# Patient Record
Sex: FEMALE | Race: WHITE
[De-identification: names, ages, dates, MRNs, and addresses within clinical notes are randomized per-mention and may not be internally consistent; named-entity substitution may affect disease eponyms.]

---

## 2019-04-10 ENCOUNTER — HOSPITAL ENCOUNTER (EMERGENCY)
Dept: HOSPITAL 75 - ER | Age: 34
Discharge: HOME | End: 2019-04-10
Payer: MEDICAID

## 2019-04-10 VITALS — DIASTOLIC BLOOD PRESSURE: 91 MMHG | SYSTOLIC BLOOD PRESSURE: 119 MMHG

## 2019-04-10 VITALS — BODY MASS INDEX: 40.98 KG/M2 | WEIGHT: 243 LBS | HEIGHT: 64.5 IN

## 2019-04-10 DIAGNOSIS — Z88.1: ICD-10-CM

## 2019-04-10 DIAGNOSIS — B34.9: Primary | ICD-10-CM

## 2019-04-10 LAB
BASOPHILS # BLD AUTO: 0 10^3/UL (ref 0–0.1)
BASOPHILS NFR BLD AUTO: 0 % (ref 0–10)
BUN/CREAT SERPL: 8
CALCIUM SERPL-MCNC: 9.7 MG/DL (ref 8.5–10.1)
CHLORIDE SERPL-SCNC: 107 MMOL/L (ref 98–107)
CO2 SERPL-SCNC: 19 MMOL/L (ref 21–32)
CREAT SERPL-MCNC: 1.33 MG/DL (ref 0.6–1.3)
EOSINOPHIL # BLD AUTO: 0.2 10^3/UL (ref 0–0.3)
EOSINOPHIL NFR BLD AUTO: 2 % (ref 0–10)
ERYTHROCYTE [DISTWIDTH] IN BLOOD BY AUTOMATED COUNT: 14.1 % (ref 10–14.5)
GFR SERPLBLD BASED ON 1.73 SQ M-ARVRAT: 47 ML/MIN
GLUCOSE SERPL-MCNC: 116 MG/DL (ref 70–105)
HCT VFR BLD CALC: 41 % (ref 35–52)
HGB BLD-MCNC: 14.2 G/DL (ref 11.5–16)
LYMPHOCYTES # BLD AUTO: 1.1 X 10^3 (ref 1–4)
LYMPHOCYTES NFR BLD AUTO: 9 % (ref 12–44)
MANUAL DIFFERENTIAL PERFORMED BLD QL: NO
MCH RBC QN AUTO: 28 PG (ref 25–34)
MCHC RBC AUTO-ENTMCNC: 34 G/DL (ref 32–36)
MCV RBC AUTO: 83 FL (ref 80–99)
MONOCYTES # BLD AUTO: 0.9 X 10^3 (ref 0–1)
MONOCYTES NFR BLD AUTO: 7 % (ref 0–12)
NEUTROPHILS # BLD AUTO: 9.8 X 10^3 (ref 1.8–7.8)
NEUTROPHILS NFR BLD AUTO: 81 % (ref 42–75)
PLATELET # BLD: 284 10^3/UL (ref 130–400)
PMV BLD AUTO: 9.1 FL (ref 7.4–10.4)
POTASSIUM SERPL-SCNC: 3.9 MMOL/L (ref 3.6–5)
SODIUM SERPL-SCNC: 137 MMOL/L (ref 135–145)
WBC # BLD AUTO: 12 10^3/UL (ref 4.3–11)

## 2019-04-10 PROCEDURE — 36415 COLL VENOUS BLD VENIPUNCTURE: CPT

## 2019-04-10 PROCEDURE — 87804 INFLUENZA ASSAY W/OPTIC: CPT

## 2019-04-10 PROCEDURE — 84703 CHORIONIC GONADOTROPIN ASSAY: CPT

## 2019-04-10 PROCEDURE — 80048 BASIC METABOLIC PNL TOTAL CA: CPT

## 2019-04-10 PROCEDURE — 93005 ELECTROCARDIOGRAM TRACING: CPT

## 2019-04-10 PROCEDURE — 85025 COMPLETE CBC W/AUTO DIFF WBC: CPT

## 2019-04-10 NOTE — XMS REPORT
Continuity of Care Document

 Created on: 04/10/2019



JOSE RAUL GARCIA

External Reference #: [id number]

: 1985

Sex: Female



Demographics







 Address  [address] [place]  [address]

 

 Home Phone  [phone] x

 

 Preferred Language  Unknown

 

 Marital Status  Unknown

 

 Rastafari Affiliation  Unknown

 

 Race  Unknown

 

 Ethnic Group  Unknown





Author







 Organization  Unknown

 

 Address  Unknown

 

 Phone  [phone]



              



Allergies

      





 Active            Description            Code            Type            
Severity            Reaction            Onset            Reported/Identified   
         Relationship to Patient            Clinical Status        

 

 Yes            Ceclor                         Drug Allergy            N/A     
       N/A                         2014                                  

 

 Yes            latex                         Drug Allergy            N/A      
      N/A                         2014                                  

 

 Yes            Penicillins                         Drug Allergy            N/A
            N/A                         2014                             
     



                      



Medications

      



There is no data.                  



Problems

      





 Date Dx Coded            Attending            Type            Code            
Diagnosis            Diagnosed By        

 

 2008            HENOK BOOKER MD                         V25.49       
     SURVEILLANCE OF OTHER CONTRACEPTIVE METHOD                     

 

 2008            GAYATHRI MEDRANO                         V25.49  
          SURVEILLANCE OF OTHER CONTRACEPTIVE METHOD                     

 

 2013            HENOK BOOKER MD                         V72.42       
     PREGNANCY TEST POSITIVE RESULT                     

 

 2013            GAYATHRI MEDRANO                         V72.42  
          PREGNANCY TEST POSITIVE RESULT                     

 

 2014            GAYATHRI MEDRANO                         719.47  
          PAIN IN JOINT INVOLVING ANKLE AND FOOT                     

 

 2014            GAYATHRI MEDRANO                         845.00  
          UNSPECIFIED SITE OF ANKLE SPRAIN                     



                            



Procedures

      





 Code            Description            Performed By            Performed On   
     

 

             56379                                  URINE PREGNANCY TEST (IN-
HOUSE)                                   2013        



                  



Results

      



There is no data.              



Encounters

      





 ACCT No.            Visit Date/Time            Discharge            Status    
        Pt. Type            Provider            Facility            Loc./Unit  
          Complaint        

 

 [id number]            2014 08:50:00            2014 23:59:59          
  [facility]            Outpatient            GAYATHRI MEDRANO                 
                              

 

 [id number]            2013 14:04:00            2013 23:59:59          
  [facility]            Outpatient            HENOK BOOKER MD

## 2019-04-10 NOTE — ED EENT
History of Present Illness


General


Stated Complaint:  EARACHE IN BOTH EARS,NAUSEA,FEVER


Source:  patient


Exam Limitations:  no limitations





History of Present Illness


Date Seen by Provider:  Apr 10, 2019


Time Seen by Provider:  22:03


Initial Comments


to ER per private vehicle with reports of awakening this morning with pain in 

both ears that has progressed throughout the day, nasal congestion, body aches 

nausea and fever. No cough and no shortness of breath.


Timing/Duration:  abrupt, this morning


Severity:  moderate


Associated Symptoms:  No cough; fever, nasal congestion/drainage





Allergies and Home Medications


Allergies


Coded Allergies:  


     Cefaclor (Verified  Allergy, Unknown, 6/27/08)





Patient Home Medication List


Home Medication List Reviewed:  Yes





Review of Systems


Review of Systems


Constitutional:  see HPI, chills, malaise


Eyes:  No Symptoms Reported


Ears:  No Symptoms Reported


Nose:  see HPI, congestion


Mouth:  no symptoms reported


Throat:  see HPI, pain


Respiratory:  no symptoms reported


Cardiovascular:  no symptoms reported


Musculoskeletal:  no symptoms reported


Skin:  no symptoms reported


Neurological:  No Symptoms Reported


Hematologic/Lymphatic:  No Symptoms Reported


Immunological/Allergic:  no symptoms reported





Past Medical-Social-Family Hx


Patient Social History


Recent Foreign Travel:  No


Contact w/Someone Who Travel:  No





Physical Exam


Height, Weight, BMI


Height: '"


Weight: lbs. oz. kg;  BMI


Method:


General Appearance:  WD/WN, no apparent distress


Eyes:  bilateral eye normal inspection, bilateral eye PERRL, bilateral eye EOMI


Ears:  bilateral ear auricle normal, bilateral ear canal normal, bilateral ear 

TM normal


Mouth/Throat:  normal mouth inspection, pharynx normal; No maxillary swelling, 

No pharynx swelling, No pharynx tenderness, No tongue swollen, No tonsillar 

exudate, No tonsillar swelling, No trismus, No uvula swelling, No voice changes


Neck:  non-tender, full range of motion


Cardiovascular:  tachycardia (tachycardic at 145)


Respiratory:  lungs clear, normal breath sounds, no respiratory distress, no 

accessory muscle use; No decreased breath sounds, No accessory muscle use, No 

crackles, No rales, No rhonchi, No stridor, No wheezing


Neurologic/Psychiatric:  alert, normal mood/affect, oriented x 3


Skin:  normal color, warm/dry





Progress/Results/Core Measures


Results/Orders


Lab Results





Laboratory Tests








Test


 4/10/19


22:05 Range/Units


 


 


White Blood Count


 12.0 H


 4.3-11.0


10^3/uL


 


Red Blood Count


 5.01 


 4.35-5.85


10^6/uL


 


Hemoglobin 14.2  11.5-16.0  G/DL


 


Hematocrit 41  35-52  %


 


Mean Corpuscular Volume 83  80-99  FL


 


Mean Corpuscular Hemoglobin 28  25-34  PG


 


Mean Corpuscular Hemoglobin


Concent 34 


 32-36  G/DL





 


Red Cell Distribution Width 14.1  10.0-14.5  %


 


Platelet Count


 284 


 130-400


10^3/uL


 


Mean Platelet Volume 9.1  7.4-10.4  FL


 


Neutrophils (%) (Auto) 81 H 42-75  %


 


Lymphocytes (%) (Auto) 9 L 12-44  %


 


Monocytes (%) (Auto) 7  0-12  %


 


Eosinophils (%) (Auto) 2  0-10  %


 


Basophils (%) (Auto) 0  0-10  %


 


Neutrophils # (Auto) 9.8 H 1.8-7.8  X 10^3


 


Lymphocytes # (Auto) 1.1  1.0-4.0  X 10^3


 


Monocytes # (Auto) 0.9  0.0-1.0  X 10^3


 


Eosinophils # (Auto)


 0.2 


 0.0-0.3


10^3/uL


 


Basophils # (Auto)


 0.0 


 0.0-0.1


10^3/uL


 


Serum Pregnancy Test,


Qualitative NEGATIVE 


 NEGATIVE  











My Orders





Orders - DEANNA ROWLEY


Ns Iv 1000 Ml (Sodium Chloride 0.9%) (4/10/19 22:00)


Ekg Tracing (4/10/19 22:00)


Cbc With Automated Diff (4/10/19 22:00)


Basic Metabolic Panel (4/10/19 22:00)


Hcg,Qualitative Serum (4/10/19 22:00)


Ondansetron Injection (Zofran Injectio (4/10/19 22:00)


Diphenhydramine Injection (Benadryl Inje (4/10/19 22:00)


Ibuprofen  Tablet (Motrin  Tablet) (4/10/19 22:00)


Influenza A And B Antigens (4/10/19 22:02)





Medications Given in ED





Current Medications








 Medications  Dose


 Ordered  Sig/Remi


 Route  Start Time


 Stop Time Status Last Admin


Dose Admin


 


 Diphenhydramine


 HCl  12.5 mg  ONCE  ONCE


 IVP  4/10/19 22:00


 4/10/19 22:03 DC 4/10/19 22:21


12.5 MG


 


 Ibuprofen  800 mg  ONCE  ONCE


 PO  4/10/19 22:00


 4/10/19 22:03 DC 4/10/19 22:17


800 MG


 


 Ondansetron HCl  4 mg  ONCE  ONCE


 IVP  4/10/19 22:00


 4/10/19 22:03 DC 4/10/19 22:19


4 MG











Departure


Impression





 Primary Impression:  


 Acute viral syndrome


Disposition:  01 HOME, SELF-CARE


Condition:  Stable





Departure-Patient Inst.


Decision time for Depature:  22:36


Referrals:  


MERI REYNA DO (PCP/Family)


Primary Care Physician


Patient Instructions:  VIRAL SYNDROME





Add. Discharge Instructions:  


1. Medication as directed do not take any other over-the-counter cough and cold 

remedies with this. Follow-up with your doctor later this week


Scripts


D-Methorphan Hb/P-Epd HCl/Bpm (Bromfed Dm Cough Syrup) 118 Ml Syrup


5 ML PO Q6H PRN for CONGESTION, #60 ML


   Prov: DEANNA ROWLEY         4/10/19


Work/School Note:  Work Release Form   Date Seen in the Emergency Department:  

Apr 10, 2019


   Return to Work:  Apr 13, 2019











DEANNA ROWLEY Apr 10, 2019 22:05

## 2020-08-26 NOTE — ED RESPIRATORY
General


Chief Complaint:  Respiratory Problems


Stated Complaint:  WHEEZING,SOB


Nursing Triage Note:  


Pt arrives by POV with c/o shortness of breath and wheezing x 2 days; states she




"always gets like this this time of year". Denies fever.


Source:  patient


Exam Limitations:  no limitations





History of Present Illness


Date Seen by Provider:  Aug 26, 2020


Time Seen by Provider:  22:15


Initial Comments


The patient arrives the ER by private conveyance with chief complaint of 

shortness of breath for the past 30 minutes with wheezing. No fevers or chills. 

Cough that is nonproductive. She has no known sick contacts or recent travel. 

She says about once a year she gets bronchitis or pneumonia similar to this. She

does not have a history of asthma, COPD or smoking any cigarettes or vaporizers.

She does follow with a primary care provider but has never been tested for 

asthma. She does not use inhalers. She's not having nausea, diarrhea, loss of 

taste or smell, nasal congestion or sore throat.





she claims a history of chronic kidney disease stage III. she has gout and uses 

Allopurinol.





Allergies and Home Medications


Allergies


Coded Allergies:  


     Cefaclor (Verified  Allergy, Unknown, 08)





Home Medications


D-Methorphan Hb/P-Epd HCl/Bpm 118 Ml Syrup, 5 ML PO Q6H PRN for CONGESTION


   Prescribed by: DEANNA ROWLEY on 4/10/19 1003





Patient Home Medication List


Home Medication List Reviewed:  Yes





Review of Systems


Review of Systems


Constitutional:  No chills, No diaphoresis


EENTM:  No ear discharge, No ear pain


Respiratory:  cough; No phlegm; short of breath, wheezing


Cardiovascular:  No chest pain, No edema, No Hx of Intervention


Gastrointestinal:  No abdominal pain, No nausea, No vomiting


Musculoskeletal:  No back pain, No joint pain





All Other Systems Reviewed


Negative Unless Noted:  Yes





Past Medical-Social-Family Hx


Patient Social History


Alcohol Use:  Denies Use


Recreational Drug Use:  No


Smoking Status:  Never a Smoker


2nd Hand Smoke Exposure:  No


Recent Foreign Travel:  No


Contact w/Someone Who Travel:  No


Recent Infectious Disease Expo:  No


Recent Hopitalizations:  No





Past Medical History


Surgeries:  Yes


 Section


Respiratory:  No


Cardiac:  No


Neurological:  No


Genitourinary:  No


Gastrointestinal:  No


Musculoskeletal:  No


Endocrine:  No


HEENT:  No


Cancer:  No


Psychosocial:  No


Integumentary:  No


Blood Disorders:  No


Adverse Reaction/Blood Tranf:  No





Physical Exam





Vital Signs - First Documented








 20





 23:05


 


Temp 36.7


 


Pulse 114


 


Resp 20


 


B/P (MAP) 137/107 (117)


 


Pulse Ox 96


 


O2 Delivery Room Air





Capillary Refill : Less Than 3 Seconds


Height: 5'4.50"


Weight: 243lbs. oz. 110.488340er; 38.00 BMI


Method:Stated


General Appearance:  WD/WN, mild distress


Eyes:  Bilateral Eye Normal Inspection, Bilateral Eye PERRL, Bilateral Eye EOMI


HEENT:  PERRL/EOMI, TMs normal, pharynx normal


Neck:  full range of motion, normal inspection


Respiratory:  no respiratory distress, no accessory muscle use, rales; No 

rhonchi, No wheezing (left lower lobe)


Cardiovascular:  normal peripheral pulses, regular rate, rhythm


Gastrointestinal:  non tender, soft


Neurologic/Psychiatric:  alert, normal mood/affect, oriented x 3


Skin:  normal color, warm/dry





Focused Exam


Lactate Level


20 22:45: Lactic Acid Level 0.86





Lactic Acid Level





Laboratory Tests








Test


 20


22:45


 


Lactic Acid Level


 0.86 MMOL/L


(0.50-2.00)











Progress/Results/Core Measures


Suspected Sepsis


Recent Fever Within 48 Hours:  No


Infection Criteria Present:  None


New/Unexplained  Altered Menta:  No


Sepsis Screen:  No Definite Risk


SIRS


Temperature: 


Pulse: 114 


Respiratory Rate: 20


 


Laboratory Tests


20 22:45: White Blood Count 8.1


Blood Pressure 137 /107 


Mean: 117


 


20 22:45: Lactic Acid Level 0.86


Laboratory Tests


20 22:45: 


Creatinine 1.27, INR Comment 1.0, Platelet Count 293, Total Bilirubin 0.7








Results/Orders


Lab Results





Laboratory Tests








Test


 20


22:45 20


22:51 Range/Units


 


 


White Blood Count


 8.1 


 


 4.3-11.0


10^3/uL


 


Red Blood Count


 4.60 


 


 4.35-5.85


10^6/uL


 


Hemoglobin 12.9   11.5-16.0  G/DL


 


Hematocrit 39   35-52  %


 


Mean Corpuscular Volume 85   80-99  FL


 


Mean Corpuscular Hemoglobin 28   25-34  PG


 


Mean Corpuscular Hemoglobin


Concent 33 


 


 32-36  G/DL





 


Red Cell Distribution Width 13.8   10.0-14.5  %


 


Platelet Count


 293 


 


 130-400


10^3/uL


 


Mean Platelet Volume 9.1   7.4-10.4  FL


 


Neutrophils (%) (Auto) 58   42-75  %


 


Lymphocytes (%) (Auto) 32   12-44  %


 


Monocytes (%) (Auto) 7   0-12  %


 


Eosinophils (%) (Auto) 3   0-10  %


 


Basophils (%) (Auto) 1   0-10  %


 


Neutrophils # (Auto) 4.6   1.8-7.8  X 10^3


 


Lymphocytes # (Auto) 2.6   1.0-4.0  X 10^3


 


Monocytes # (Auto) 0.6   0.0-1.0  X 10^3


 


Eosinophils # (Auto)


 0.2 


 


 0.0-0.3


10^3/uL


 


Basophils # (Auto)


 0.0 


 


 0.0-0.1


10^3/uL


 


Prothrombin Time 13.0   12.2-14.7  SEC


 


INR Comment 1.0   0.8-1.4  


 


Activated Partial


Thromboplast Time 31 


 


 24-35  SEC





 


Sodium Level 139   135-145  MMOL/L


 


Potassium Level 4.5   3.6-5.0  MMOL/L


 


Chloride Level 108 H    MMOL/L


 


Carbon Dioxide Level 21   21-32  MMOL/L


 


Anion Gap 10   5-14  MMOL/L


 


Blood Urea Nitrogen 17   7-18  MG/DL


 


Creatinine


 1.27 


 


 0.60-1.30


MG/DL


 


Estimat Glomerular Filtration


Rate 49 


 


  





 


BUN/Creatinine Ratio 13    


 


Glucose Level 91     MG/DL


 


Lactic Acid Level


 0.86 


 


 0.50-2.00


MMOL/L


 


Calcium Level 8.8   8.5-10.1  MG/DL


 


Corrected Calcium 9.0   8.5-10.1  MG/DL


 


Total Bilirubin 0.7   0.1-1.0  MG/DL


 


Aspartate Amino Transf


(AST/SGOT) 16 


 


 5-34  U/L





 


Alanine Aminotransferase


(ALT/SGPT) 36 


 


 0-55  U/L





 


Alkaline Phosphatase 55     U/L


 


Total Protein 6.8   6.4-8.2  GM/DL


 


Albumin 3.7   3.2-4.5  GM/DL


 


Urine Color  YELLOW   


 


Urine Clarity  SL CLOUDY   


 


Urine pH  6.0  5-9  


 


Urine Specific Gravity  1.010 L 1.016-1.022  


 


Urine Protein  NEGATIVE  NEGATIVE  


 


Urine Glucose (UA)  NEGATIVE  NEGATIVE  


 


Urine Ketones  NEGATIVE  NEGATIVE  


 


Urine Nitrite  NEGATIVE  NEGATIVE  


 


Urine Bilirubin  NEGATIVE  NEGATIVE  


 


Urine Urobilinogen  0.2  < = 1.0  MG/DL


 


Urine Leukocyte Esterase  1+ H NEGATIVE  


 


Urine RBC (Auto)  2+ H NEGATIVE  


 


Urine RBC  RARE   /HPF


 


Urine WBC  25-50 H  /HPF


 


Urine Squamous Epithelial


Cells 


 25-50 H


  /HPF





 


Urine Crystals  NONE   /LPF


 


Urine Bacteria  FEW H  /HPF


 


Urine Casts  NONE   /LPF


 


Urine Mucus  NEGATIVE   /LPF


 


Urine Culture Indicated  YES   








My Orders





Orders - CARLEY ERICKSON


Cbc With Automated Diff (20 22:38)


Comprehensive Metabolic Panel (20 22:38)


Blood Culture (20 22:38)


Sputum Culture (20 22:38)


Urinalysis (20 22:38)


Urine Culture (20 22:38)


Protime With Inr (20 22:38)


Partial Thromboplastin Time (20 22:38)


Chest 1 View, Ap/Pa Only (20 22:38)


Ed Iv/Invasive Line Start (20 22:38)


Ed Iv/Invasive Line Start (20 22:38)


Vital Signs Adult Sepsis Patie Q15M (20 22:38)


O2 (20 22:38)


Remove Rings In Anticipation O (20 22:38)


Lactic Acid Analyzer (20 22:38)


Ns Iv 1000 Ml (Sodium Chloride 0.9%) (20 22:38)


Levofloxacin 750 Mg/150 Ml Iv (Levaquin (20 22:45)


Rx-Albuterol Inhaler (Rx-Ventolin Hfa) (20 22:38)





Medications Given in ED





Current Medications








 Medications  Dose


 Ordered  Sig/Remi


 Route  Start Time


 Stop Time Status Last Admin


Dose Admin


 


 Levofloxacin/


 Dextrose  150 ml @ 


 100 mls/hr  ONCE  ONCE


 IV  20 22:45


 20 00:14  20 23:12


100 MLS/HR








Vital Signs/I&O











 20





 23:05


 


Temp 36.7


 


Pulse 114


 


Resp 20


 


B/P (MAP) 137/107 (117)


 


Pulse Ox 96


 


O2 Delivery Room Air





Capillary Refill : Less Than 3 Seconds








Blood Pressure Mean:                    117








Progress Note :  


   Time:  23:29


Progress Note


patient's tachycardic around 110-115 and has a cough with rails and subjective 

shortness of air. Oxygen sats are 96-98% on room air. No history of lung 

disease. Plan to initiate a septic workup but her white count was not elevated 

nor is her respiratory rate so she is not septic. We'll give her a liter of 

fluids to help rehydrate her and a pro-air inhaler which she said she did get 

some improvement from. Because of her history of Ceclor allergy we will give her

Levaquin. no clear infiltrate on chest x-ray and no white count. Could be a 

viral versus bacterial pneumonia so we will swab her for COVID-19 and put her on

outpatient Levaquin.





Diagnostic Imaging





   Diagonstic Imaging:  Xray


   Plain Films/CT/US/NM/MRI:  chest


Comments


no clear infiltrate on one view chest x-ray.


   Reviewed:  Reviewed by Me





Departure


Impression





   Primary Impression:  


   Pneumonia


   Qualified Codes:  J18.9 - Pneumonia, unspecified organism


   Additional Impression:  


   Person under investigation for COVID-19


Disposition:  01 HOME, SELF-CARE


Condition:  Improved





Departure-Patient Inst.


Decision time for Depature:  23:30


Referrals:  


MERI REYNA DO (PCP/Family)


Primary Care Physician


Patient Instructions:  Pneumonia, Adult (DC), Coronavirus Disease 2019 (COVID-

19) Overview





Add. Discharge Instructions:  


2 puffs of albuterol through the spacer every 4 hours as necessary for wheezing 

or chronic cough.


Tessalon Perles 1 capsule every 6 hours as necessary for coughing.


Drink plenty of fluids.


Stay quarantined for 10 days from now until 20 and you are 72 hours without 

symptoms.


 the Levaquin and take one tablet daily with food for the next 6 days.


We will call you in the next 2-3 days with results from your COVID-19 test.


All discharge instructions reviewed with patient and/or family. Voiced 

understanding.


Scripts


Levofloxacin (Levaquin) 750 Mg Tablet


750 MG PO DAILY for 6 Days, #6 TAB 0 Refills


   Prov: CARLEY ERICKSON         20 


Benzonatate (TESSALON PERLES) 100 Mg Capsule


100 MG PO Q6H PRN for COUGH, #30 CAP 0 Refills


   Prov: CARLEY ERICKSON         20


Work/School Note:  Work Release Form   Date Seen in the Emergency Department:  

Aug 26, 2020


   Return to Work:  Sep 5, 2020


   Restrictions:  No Restrictions


   Other Restrictions Listed Below:  May return to work 10 days from start of 

symptoms and 72 hours symptom free











CARLEY ERICKSON                 Aug 26, 2020 23:30

## 2020-08-27 NOTE — DIAGNOSTIC IMAGING REPORT
INDICATION: Shortness of air.



COMPARISON: None



FINDINGS: Single frontal radiographic view of the chest was

obtained and shows moderate cardiomegaly and pulmonary vascular

congestion. Pulmonary interstitium is also mildly diffusely

prominent. There is no large effusion or pneumothorax. Osseous

structures show no acute abnormalities.



IMPRESSION:

1. Cardiomegaly with sequela of CHF including probable mild

interstitial pulmonary edema.



Dictated by: 



  Dictated on workstation # [id number]

## 2020-10-06 NOTE — DIAGNOSTIC IMAGING REPORT
INDICATION: Dyspnea and tachycardia.



DETAILS OF PROCEDURE: Patient was administered 5.1 mCi

technetium-99m MAA intravenously and imaging over the chest was

performed at multiple obliquities.



There is homogeneous perfusion of both lungs. No pleural-based

perfusion defects are identified.



IMPRESSION: Normal perfusion lung scan.



Dictated by: 



  Dictated on workstation # [id number] Intrauterine Device   WHAT YOU NEED TO KNOW:   An intrauterine device (IUD) is a type of birth control that is inserted into your uterus  It is a small, flexible piece of plastic with a string on the end  It is inserted and removed by your healthcare provider  IUDs prevent sperm from reaching or fertilizing an egg  IUDs also prevent a fertilized egg from attaching to the uterus and developing into a fetus  DISCHARGE INSTRUCTIONS:   Make sure your IUD is in place: An IUD has a string that is made of plastic thread  One to 2 inches of this string hangs into your vagina  You cannot see this string, and it will not cause problems when you have sex  Check your IUD string every 3 days for the first 3 months that you have your IUD  After that, check the string after each monthly period  Do the following to check the placement of your IUD:  · Wash your hands with soap and warm water  Dry them with a clean towel  · Bend your knees and squat low to the ground  · Gently put your index finger inside your vagina  The cervix is at the top of the vagina and feels like the tip of your nose  Feel for the IUD string  Do not pull on the string  You should not be able to feel the firm plastic of the IUD itself  · Wash your hands after you check your IUD string  For more information:   · [facility] [address] , [address]  Phone: 3- 922 - [phone]  Web Address: https://[employer]/  org  Follow up with your healthcare provider as directed:  Write down your questions so you remember to ask them during your visits  Contact your healthcare provider if:   · You think you are pregnant  · You bleed after you have sex  · You have pain during sex  · You cannot feel the IUD string, the string feels longer, or you feel the plastic of the IUD itself  · You have vaginal discharge that is green, yellow, or has a foul odor      · You have questions or concerns about your condition or care  Seek care immediately if:   · You have severe pain or bleeding during your period  · You have a fever and severe abdominal pain  © 2017 [address] [employer]  Information is for End User's use only and may not be sold, redistributed or otherwise used for commercial purposes  All illustrations and images included in CareNotes® are the copyrighted property of A D A M , Inc  or Wilmer Marley  The above information is an  only  It is not intended as medical advice for individual conditions or treatments  Talk to your doctor, nurse or pharmacist before following any medical regimen to see if it is safe and effective for you  Levonorgestrel (Into the uterus)   Levonorgestrel (gya-omm-hyj-VIKTOR-trel)  Prevents pregnancy and treats heavy menstrual bleeding  This is an intrauterine device (IUD), which is a reversible form of birth control  This IUD slowly releases levonorgestrel, a hormone  Brand Name(s): Roel Conquest, Mirena, [place]   There may be other brand names for this medicine  When This Medicine Should Not Be Used: This device is not right for everyone  Do not use it if you had an allergic reaction to levonorgestrel, or you are pregnant  How to Use This Medicine:   Device  · The IUD is usually inserted by your doctor during your monthly period  You will need to see your doctor 4 to 6 weeks after the IUD is placed and then once a year  · Your IUD has a string or "tail" that is made of plastic thread  About one or two inches of this string hangs into your vagina  You cannot see this string, and it will not cause problems when you have sex  Check your IUD after each monthly period  You may not be protected against pregnancy if you cannot feel the string or if you feel plastic  Do the following to check the placement of your IUD:  [facility] [place] soap and warm water  Dry them with a clean towel    ¨ Bend your knees and squat low to the ground  ¨ Gently put your index finger high inside your vagina  The cervix is at the top of the vagina  Find the IUD string coming from your cervix  Never pull on the string  You should not be able to feel the plastic of the IUD itself  Wash your hands after you are done checking your IUD string  · Your doctor will need to replace your IUD after 3 years for [facility] or [place], or after 5 years for [facility] or [facility]  You will also need to have it replaced if it comes out of your uterus  Drugs and Foods to Avoid:   Ask your doctor or pharmacist before using any other medicine, including over-the-counter medicines, vitamins, and herbal products  · Some medicines can affect how this device works  Tell your doctor if you are using a blood thinner (including warfarin)  Warnings While Using This Medicine:   · Tell your doctor if you are breastfeeding, or you have had a baby, miscarriage, or  in the past 3 months  Tell your doctor if you have liver disease (including tumor or cancer), breast cancer, heart or blood circulation problems, including a history of heart valve problems, heart disease, blood clotting problems, stroke, heart attack, or high blood pressure  Tell your doctor if you have problems with your immune system or have had surgery on your female organs (especially fallopian tubes)  · Tell your doctor if you have had any problems, infections, or other conditions that affected your reproductive system  There are many problems that could make an IUD a bad choice for you, including if you have fibroids, unexplained bleeding, a uterus that has an unusual shape, a recent infection, pelvic inflammatory disease, abnormal Pap test, ectopic pregnancy, cancer or suspected cancer, or an existing IUD  · There is a small chance that you could get pregnant when using an IUD, just as there is with any birth control   If you get pregnant, your doctor may remove your IUD to lower the risk of miscarriage or other problems  · This medicine may cause the following problems:  ¨ Increased risk of ectopic pregnancy (pregnancy outside the uterus)  ¨ Increased risk of a serious infection called pelvic inflammatory disease (PID)  ¨ Increased risk for ovarian cysts  ¨ Perforation (hole in the wall of your uterus), which can damage other organs  · You might have some spotting and cramping during the first weeks after the IUD has been inserted  These symptoms should decrease or go away within a few weeks up to 6 months  · You could have less bleeding or even stop having periods by the end of the first year  Call your doctor if you have a change from the regular bleeding pattern after you have had your IUD for awhile, such as more bleeding or if you miss a period (and you were having periods even with your IUD)  · An IUD can slip partly or all of the way out of your uterus  If this happens, use condoms or another form of birth control, and call your doctor right away  · This IUD will not protect you from HIV/AIDS, herpes, or other sexually transmitted diseases    · If you have the Rosey Pember[place] or [facility] [place], tell your healthcare provider before you have an MRI test   Possible Side Effects While Using This Medicine:   Call your doctor right away if you notice any of these side effects:  · Allergic reaction: Itching or hives, swelling in your face or hands, swelling or tingling in your mouth or throat, chest tightness, trouble breathing  · Chest pain, problems with speech or walking, numbness or weakness in your arm or leg or on one side of your body  · Heavy bleeding from your vagina  · Pain during sex, or if your partner feels the hard plastic of the IUD during sex  · Severe headache, vision changes  · Stomach or pelvic pain, tenderness, or cramping that is sudden or severe  · Vaginal discharge has a bad smell, fever, chills, sores on your genitals  · Yellow skin or eyes  If you notice these less serious side effects, talk with your doctor:   · Acne or other skin changes  · Breast pain  · Change in bleeding pattern after the first few months  · Dizziness or lightheadedness after IUD is placed  · Mild itching around your vagina and genitals  If you notice other side effects that you think are caused by this medicine, tell your doctor  Call your doctor for medical advice about side effects  You may report side effects to FDA at 9-011-FDA-9778  © 2017 [address] [employer] Camacho Information is for End User's use only and may not be sold, redistributed or otherwise used for commercial purposes  The above information is an  only  It is not intended as medical advice for individual conditions or treatments  Talk to your doctor, nurse or pharmacist before following any medical regimen to see if it is safe and effective for you

## 2020-10-06 NOTE — NUR
RECEIVED CALL FROM MONITORS FOR 'S.  RN WENT TO BEDSIDE AND PT WAS UP IN BA, NO 
COMPLAINTS OR DISCOMFORT REPORTED BY PT.  WILL CONTINUE TO MONITOR.

## 2020-10-06 NOTE — DIAGNOSTIC IMAGING REPORT
INDICATION:  Chest pain.



TECHNIQUE:  Single view chest 4:05 AM.



CORRELATION STUDY:  08/26/2020



FINDINGS: 

Heart size remains mildly enlarged. Vasculature slightly

increased.  

Increasing opacity at the right lung base could reflect

underlying infiltrate.



IMPRESSION: 

1. Cardiac enlargement with what appears to be increasing

component of vascularity.

2. Superimposed infiltrate or edema at the right lung base.



Dictated by: 



  Dictated on workstation # [id number]

## 2020-10-06 NOTE — HISTORY & PHYSICAL-HOSPITALIST
History of Present Illness


HPI/Chief Complaint


Pt is a 34yoCF with a PMH of gout and recent bronchitis who presented to the ER 

due to shortness of breath. She states that all of her symptoms started on  

and she was diagnosed with bronchitis at that time. She has had a chronic cough 

and SOB since that time. She states she woke up last night though at 0138 and wa

s very SOB and couldn't take a deep breath prompting her to seek evaluation in 

the ER. She states it felt just like when she was sick in August. She reports 

feeling better today and has been off oxygen even. 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


Source:  patient


Date Seen


10/6/20


Time Seen by a Provider:  13:28


Attending Physician


Sherman Shipley MD


PCP


Kailash Jimenez DO


Referring Physician





Date of Admission


Oct 6, 2020 at 04:42





Home Medications & Allergies


Home Medications


Reviewed patient Home Medication Reconciliation performed by pharmacy medication

reconciliations technician and/or nursing.


Patients Allergies have been reviewed.





Allergies





Allergies


Coded Allergies


  Penicillins (Verified Allergy, Unknown, 10/6/20)


  cefaclor (Verified Allergy, Unknown, 08)








Past Medical-Social-Family Hx


Past Med/Social Hx:  Reviewed Nursing Past Med/Soc Hx


Patient Social History


Marrital Status:  


Employed/Student:  employed


Alcohol Use:  Denies Use


Recreational Drug Use:  No


Smoking Status:  Never a Smoker


2nd Hand Smoke Exposure:  No


Recent Foreign Travel:  No


Contact w/other who traveled:  No


Recent Hopitalizations:  No


Recent Infectious Disease Expo:  No





Immunizations Up To Date


Date of Pneumonia Vaccine:  Oct 6, 2013


Date of Influenza Vaccine:  Sep 30, 2020





Past Medical History


Surgeries:   Section


Pregnant:  No


Musculoskeletal:  Gout


History of Blood Disorders:  No


Adverse Reaction to Blood [place]:  No





Family History


Reviewed Nursing Family Hx





Review of Systems


Constitutional:  No chills, No fever


EENTM:  no symptoms reported


Respiratory:  cough, dyspnea on exertion, short of breath


Cardiovascular:  see HPI, chest pain; No edema


Gastrointestinal:  No abdominal pain, No constipation, No diarrhea, No nausea, 

No vomiting


Genitourinary:  No dysuria, No frequency


Musculoskeletal:  No joint pain, No muscle pain, No muscle weakness


Skin:  no symptoms reported


Psychiatric/Neurological:  No Symptoms Reported





Physical Exam


Physical Exam


Vital Signs





Vital Signs - First Documented








 10/6/20 10/6/20 10/6/20





 02:45 04:24 08:17


 


Temp 36.3  


 


Pulse 135  


 


Resp 24  


 


B/P (MAP) 130/104 (113)  


 


Pulse Ox  95 


 


O2 Delivery Room Air  


 


O2 Flow Rate  2.00 


 


FiO2   28





Capillary Refill : Less Than 3 Seconds


Height, Weight, BMI


Height: 5'4.50"


Weight: 243lbs. oz. 110.846583wi; 35.14 BMI


Method:Stated


General Appearance:  No Apparent Distress, WD/WN, Obese


HEENT:  PERRL/EOMI, Moist Mucous Membranes


Neck:  Normal Inspection, Supple


Respiratory:  Lungs Clear, No Accessory Muscle Use, Other (on 2lpm)


Cardiovascular:  Regular Rate, Rhythm, No Murmur


Gastrointestinal:  Normal Bowel Sounds, Non Tender, Soft


Extremity:  Normal Capillary Refill, Normal Inspection, No Calf Tenderness, No 

Pedal Edema


Neurologic/Psychiatric:  Alert, Oriented x3, Normal Mood/Affect


Skin:  Normal Color, Warm/Dry





Results


Results/Procedures


Labs


Laboratory Tests


10/6/20 02:50








Patient resulted labs reviewed.


Imaging


                 [facility] [place] [place]





NAME:   JOSE RAUL GARCIA


MED REC#:   [id number]


ACCOUNT#:   [id number]


PT STATUS:   ADM Irena


:   1985


PHYSICIAN:   MAIN GORDON MD


ADMIT DATE:   10/06/20/4TH


                                  ***Signed***


Date of Exam:10/06/20





CHEST 1 VIEW, AP/PA ONLY








INDICATION:  Chest pain.





TECHNIQUE:  Single view chest 4:05 AM.





CORRELATION STUDY:  2020





FINDINGS: 


Heart size remains mildly enlarged. Vasculature slightly


increased.  


Increasing opacity at the right lung base could reflect


underlying infiltrate.





IMPRESSION: 


1. Cardiac enlargement with what appears to be increasing


component of vascularity.


2. Superimposed infiltrate or edema at the right lung base.





Dictated by: 





  Dictated on workstation # [id number]








Dict:   10/06/20 0555


Trans:   10/06/20 1146


AMANDEEP [id number]





Interpreted by:     LUCINA MOSHER DO


Electronically signed by: LUCINA MOSHER DO 10/06/20 1146





Assessment/Plan


Admission Diagnosis


Hypoxia


Admission Status:  Inpatient Order (span 2 midnights)


Reason for Inpatient Admission:  


see below





Assessment and Plan


Hypoxia


COVID19


sinus tachycardia


   Unclear etiology but COVID PCR pending at this time


   Questionable cardiac enlargement on CXR


   Cardiology consulted, appreciate recs


   Echo ordered


   Procal negative so hold abx


   D-dimer up so VQ scan ordered (unable to get CTA due to creatinine elevation)


   BNP elevated- lasix


   Metoprolol for sinus tach





DVT ppx: on therapeutic lovenox for elevated D-dimer





Diagnosis/Problems


Diagnosis/Problems





(1) Person under investigation for COVID-19


Status:  Acute


(2) Tachycardia


Status:  Acute


(3) Dyspnea


Status:  Acute


Qualifiers:  


   Dyspnea type:  shortness of breath  Qualified Codes:  R06.02 - Shortness of 

breath





Clinical Quality Measures


DVT/VTE Risk/Contraindication:


Risk Factor Score Per Nursin


RFS Level Per Nursing on Admit:  2=Moderate











KUSUM BELTRAN MD               Oct 6, 2020 13:34

## 2020-10-06 NOTE — CONSULTATION-CARDIOLOGY
HPI-Cardiology


Cardiology Consultation:


Date of Consultation


10/6/20


Time Seen by a Provider:  14:17


Date of Admission





Attending Physician


Sherman Shipley MD


Admitting Physician


Kailash Jimenez DO


Consulting Physician


KEITH PICKENS MD, MA, FACP, FACC, FSCAI, CCDS





HPI:


Chief Complaint:





Reason for consultation: Chest discomfort





HPI


33 yo woman with several weeks of productive cough, diagnosed as bronchitis, 

treated with antibiotics, but continues to have persistent symptoms. After a 

coughing bout this am, has been experiencing a generalized feeling of chest 

tightness, mild, w/o radiation or relieving factors, but worse with coughing. 

Notes gen malaise. Does not report fever or chills. Feels short of breath with 

mild activity





Review of Systems-Cardiology


Review of Systems


Constitutional:  malaise, tiredness; No weight loss, No weight gain


Eyes:  No vision change


Ears/Nose/Throat:  No ear discharge, No nasal drainage, No recent hearing loss


Respiratory:  As described under HPI


Cardiovascular:  As described under HPI


Gastrointestinal:  No nausea, No vomiting


Genitourinary:  No dysuria, No hematuria, No urine frequency changes


Musculoskeletal:  No back pain, No joint pain


Skin:  No rash


Psychiatric/Neurological:  No seizure, No focal weakness, No syncope


Hematologic:  No bleeding abnormalities





All Other Systems Reviewed


Negative Unless Noted:  Yes





PMH-Social-Family Hx


Patient Social History


Alcohol Use:  Denies Use


Recreational Drug Use:  No


Smoking Status:  Never a Smoker


2nd Hand Smoke Exposure:  No


Recent Foreign Travel:  No


Recent Infectious Disease Expo:  No


Hospitalization with Isolation:  Denies





Immunizations Up To Date


Date of Pneumonia Vaccine:  Oct 6, 2013


Date of Influenza Vaccine:  Sep 30, 2020





Past Medical History


PMH


As described under Assessment.





Family Medical History


Family Medical History:  


Does not report fam h/o early CAD or SCD





Allergies and Home Medications


Allergies


Coded Allergies:  


     Penicillins (Verified  Allergy, Unknown, 10/6/20)


     cefaclor (Verified  Allergy, Unknown, 08)





Home Medications


Allopurinol 300 Mg Tablet, 300 MG PO DAILY, (Reported)


Benzonatate 100 Mg Capsule, 100 MG PO Q6H PRN for COUGH


   Prescribed by: CARLEY ERICKSON on 20 2340


D-Methorphan Hb/P-Epd HCl/Bpm 118 Ml Syrup, 5 ML PO Q6H PRN for CONGESTION


   Prescribed by: DEANNA ROWLEY on 4/10/19 2237


Levofloxacin 750 Mg Tablet, 750 MG PO DAILY


   Prescribed by: CARLEY ERICKSON on 20 2340





Patient Home Medication List


Home Medication List Reviewed:  Yes





Physical Exam-Cardiology


Physical Exam


Vital Signs/I&O











 10/6/20 10/6/20 10/6/20 10/6/20





 02:45 04:24 05:30 05:45


 


Temp 36.3  37.0 36.4


 


Pulse 135 122 129 118


 


Resp 24 20 14 21


 


B/P (MAP) 130/104 (113) 134/95 (108) 127/97 (108) 130/90


 


Pulse Ox  95 97 93


 


O2 Delivery Room Air Nasal Cannula Nasal Cannula Nasal Cannula


 


O2 Flow Rate  2.00 2.00 2.00


 


    





 10/6/20 10/6/20 10/6/20 10/6/20





 05:45 06:14 08:10 08:15


 


Temp   36.7 


 


Pulse  124 125 


 


Resp   20 


 


B/P (MAP)   126/93 (104) 


 


Pulse Ox   97 


 


O2 Delivery Nasal Cannula  Nasal Cannula Nasal Cannula


 


O2 Flow Rate 2.00  2.00 2.00


 


    





 10/6/20 10/6/20 10/6/20 





 08:17 11:59 13:35 


 


Temp 36.4 37.0  


 


Pulse 124 131 140 


 


Resp  20  


 


B/P (MAP)  123/91 (102)  


 


Pulse Ox 94 94  


 


O2 Delivery  Nasal Cannula  


 


O2 Flow Rate  2.00  


 


FiO2 28   





Capillary Refill : Less Than 3 Seconds


Constitutional:  AAO x 3, well-developed, well-nourished


HEENT:  EOMI, hearing is well preserved; No xanthelasmas are seen


Neck:  carotid pulses are 2 + bilaterally, with good upstrokes


Respiratory:  No accessory muscle use; other (fair to good air entry, diminished

at the bases)


Cardiovascular:  S1 and S2, systolic murmur (faint FREDDY at card base)


Gastrointestinal:  No tender; soft; No guarding, No rebound; audible bowel 

sounds


Extremities:  No clubbing, No cyanosis, No significant edema


Neurologic/Psychiatric:  oriented x 3, other (moves all limbs equally)


Skin:  No rash on exposed areas, No ulcerations on exposed areas





Data Review


Labs


Laboratory Tests


10/6/20 02:50: 


White Blood Count 10.6, Red Blood Count 4.45, Hemoglobin 12.5, Hematocrit 39, 

Mean Corpuscular Volume 87, Mean Corpuscular Hemoglobin 28, Mean Corpuscular 

Hemoglobin Concent 32, Red Cell Distribution Width 14.0, Platelet Count 266, 

Mean Platelet Volume 8.7L, Immature Granulocyte % (Auto) 0, Neutrophils (%) 

(Auto) 64, Lymphocytes (%) (Auto) 26, Monocytes (%) (Auto) 7, Eosinophils (%) 

(Auto) 3, Basophils (%) (Auto) 1, Neutrophils # (Auto) 6.8, Lymphocytes # (Auto)

2.7, Monocytes # (Auto) 0.7, Eosinophils # (Auto) 0.3, Basophils # (Auto) 0.1, 

Immature Granulocyte # (Auto) 0.0, D-Dimer 2.24H, Sodium Level 141, Potassium 

Level 3.7, Chloride Level 107, Carbon Dioxide Level 22, Anion Gap 12, Blood Urea

Nitrogen 17, Creatinine 1.51H, Estimat Glomerular Filtration Rate 40, 

BUN/Creatinine Ratio 11, Glucose Level 95, Calcium Level 8.8, Corrected Calcium 

9.0, Total Bilirubin 1.5H, Aspartate Amino Transf (AST/SGOT) 16, Alanine 

Aminotransferase (ALT/SGPT) 28, Alkaline Phosphatase 56, Troponin I 0.034H, C-

Reactive Protein High Sensitivity 1.19H, B-Type Natriuretic Peptide 497.0H, 

Total Protein 6.7, Albumin 3.8, Procalcitonin 0.05, Thyroid Stimulating Hormone 

(TSH) 3.41


10/6/20 03:34: 


Urine Color YELLOW, Urine Clarity CLEAR, Urine pH 5.5, Urine Specific Gravity 

1.020, Urine Protein NEGATIVE, Urine Glucose (UA) NEGATIVE, Urine Ketones 

NEGATIVE, Urine Nitrite NEGATIVE, Urine Bilirubin NEGATIVE, Urine Urobilinogen 

0.2, Urine Leukocyte Esterase TRACEH, Urine RBC (Auto) NEGATIVE, Urine RBC NONE,

Urine WBC NONE, Urine Squamous Epithelial Cells 2-5, Urine Crystals NONE, Urine 

Bacteria NEGATIVE, Urine Casts NONE, Urine Mucus NEGATIVE, Urine Culture Indicat

ed NO


10/6/20 04:32: Coronavirus 2019 (BELLE) Negative





A/P-Cardiology


Assessment/Admission Diagnosis





Prob R lower lobe pneumonia of undetermined etiology





Vol overload vs ac diastolic CHF





Chest discomfort likely noncardiac. Minimal troponin elevation due to reasons 

noted above. No evidence of type I MI





Discussion and Recomendations





* iv diuretics today


* BB


* Monitor labs


* Management of pneumonia / resp tract infection is with the [facility]





Clinical Quality Measures


DVT/VTE Risk/Contraindication:


Risk Factor Score Per Nursin


RFS Level Per Nursing on Admit:  2=Moderate











KEITH PICKENS MD FACP FAC CCDS    Oct 6, 2020 14:22

## 2020-10-06 NOTE — ED RESPIRATORY
General


Chief Complaint:  Chest Wall


Stated Complaint:  CHEST TIGHTNESS


Nursing Triage Note:  


TO ED VIA POV AND AMBULATORY TO ROOM 10 UNDER COVID PUI PRECAUTIONS. RECENT 


BRONCHITIS. USING SYMBICORT BID. TONIGHT WOKE UP, COUGHED UP PHLEGM, AND THEN 


FELT LIKE SHE COULDN'T CATCH HER BREATH.


Source:  patient


Exam Limitations:  no limitations





History of Present Illness


Date Seen by Provider:  Oct 6, 2020


Time Seen by Provider:  02:48


Initial Comments


Here with report of waking up the middle the night and coughing up phlegm. She 

had chest pain afterwards and felt like she couldn't catch her breath. She has 

not been sick recently although had pneumonia over a month ago. Patient denies 

contact with COVID positive persons or any but it's been sick. She is a 

stay-at-home mom. Nobody in her family is sick. Reports that she was doing 

better after recent illness until suddenly tonight after she coughed.


Timing/Duration:  constant


Severity:  moderate


Prior Episodes/Possible Cause:  occasional episodes


Modifying Factors:  Worse With Coughing; Improves With Rest


Associated Symptoms:  cough; No fever/chills, No nasal congestion; shortness of 

breath; No sore throat, No wheezing





Allergies and Home Medications


Allergies


Coded Allergies:  


     Penicillins (Verified  Allergy, Unknown, 10/6/20)


     cefaclor (Verified  Allergy, Unknown, 08)





Home Medications


Benzonatate 100 Mg Capsule, 100 MG PO Q6H PRN for COUGH


   Prescribed by: CARLEY ERICKSON on 20 2340


D-Methorphan Hb/P-Epd HCl/Bpm 118 Ml Syrup, 5 ML PO Q6H PRN for CONGESTION


   Prescribed by: DEANNA ROWLEY on 4/10/19 2237


Levofloxacin 750 Mg Tablet, 750 MG PO DAILY


   Prescribed by: CARLEY ERICKSON on 20 2340





Patient Home Medication List


Home Medication List Reviewed:  Yes





Review of Systems


Review of Systems


Constitutional:  No chills, No fever


EENTM:  No nose congestion, No throat pain


Respiratory:  cough, short of breath


Cardiovascular:  chest pain; No edema


Gastrointestinal:  No abdominal pain, No nausea, No vomiting


Genitourinary:  no symptoms reported


Musculoskeletal:  no symptoms reported


Skin:  no symptoms reported





All Other Systems Reviewed


Negative Unless Noted:  Yes





Past Medical-Social-Family Hx


Past Med/Social Hx:  Reviewed Nursing Past Med/Soc Hx


Patient Social History


Alcohol Use:  Denies Use


Recreational Drug Use:  No


Smoking Status:  Never a Smoker


2nd Hand Smoke Exposure:  No


Recent Foreign Travel:  No


Contact w/Someone Who Travel:  No


Recent Infectious Disease Expo:  No


Recent Hopitalizations:  No


Physical Abuse:  No


Sexual Abuse:  No


Mistreated:  No


Fear:  No





Immunizations Up To Date


Date of Influenza Vaccine:  Sep 1, 2020





Past Medical History


Surgeries:  Yes


 Section


Respiratory:  No


Cardiac:  No


Neurological:  No


Genitourinary:  No


Gastrointestinal:  No


Musculoskeletal:  Yes


Gout


Endocrine:  No


HEENT:  No


Cancer:  No


Psychosocial:  No


Integumentary:  No


Blood Disorders:  No


Adverse Reaction/Blood Tranf:  No





Family Medical History


Reviewed Nursing Family Hx





Physical Exam





Vital Signs - First Documented








 10/6/20 10/6/20





 02:45 04:24


 


Temp 36.3 


 


Pulse 135 


 


Resp 24 


 


B/P (MAP) 130/104 (113) 


 


Pulse Ox  95


 


O2 Delivery Room Air 


 


O2 Flow Rate  2.00





Capillary Refill : Less Than 3 Seconds


Height: 5'4.50"


Weight: 243lbs. oz. 110.889408iy; 35.00 BMI


Method:Stated


General Appearance:  WD/WN, no apparent distress


HEENT:  PERRL/EOMI, pharynx normal


Neck:  full range of motion, supple


Respiratory:  no accessory muscle use, crackles (left base)


Cardiovascular:  no murmur, tachycardia


Gastrointestinal:  non tender, soft


Extremities:  non-tender, normal inspection


Neurologic/Psychiatric:  alert, oriented x 3


Skin:  normal color, warm/dry





Progress/Results/Core Measures


Suspected Sepsis


Recent Fever Within 48 Hours:  No


Infection Criteria Present:  Suspected New Infection


New/Unexplained  Altered Menta:  No


Sepsis Screen:  Possible Severe Sepsis Risk


SIRS


Temperature: 


Pulse: 135 


Respiratory Rate: 24


 


Laboratory Tests


10/6/20 02:50: White Blood Count 10.6


Blood Pressure 130 /104 


Mean: 113


 


Laboratory Tests


10/6/20 02:50: 


Creatinine 1.51H, Platelet Count 266, Total Bilirubin 1.5H








Results/Orders


Lab Results





Laboratory Tests








Test


 10/6/20


02:50 10/6/20


03:34 10/6/20


04:32 Range/Units


 


 


White Blood Count


 10.6 


 


 


 4.3-11.0


10^3/uL


 


Red Blood Count


 4.45 


 


 


 3.80-5.11


10^6/uL


 


Hemoglobin 12.5    11.5-16.0  g/dL


 


Hematocrit 39    35-52  %


 


Mean Corpuscular Volume 87    80-99  fL


 


Mean Corpuscular Hemoglobin 28    25-34  pg


 


Mean Corpuscular Hemoglobin


Concent 32 


 


 


 32-36  g/dL





 


Red Cell Distribution Width 14.0    10.0-14.5  %


 


Platelet Count


 266 


 


 


 130-400


10^3/uL


 


Mean Platelet Volume 8.7 L   9.0-12.2  fL


 


Immature Granulocyte % (Auto) 0     %


 


Neutrophils (%) (Auto) 64    42-75  %


 


Lymphocytes (%) (Auto) 26    12-44  %


 


Monocytes (%) (Auto) 7    0-12  %


 


Eosinophils (%) (Auto) 3    0-10  %


 


Basophils (%) (Auto) 1    0-10  %


 


Neutrophils # (Auto)


 6.8 


 


 


 1.8-7.8


10^3/uL


 


Lymphocytes # (Auto)


 2.7 


 


 


 1.0-4.0


10^3/uL


 


Monocytes # (Auto)


 0.7 


 


 


 0.0-1.0


10^3/uL


 


Eosinophils # (Auto)


 0.3 


 


 


 0.0-0.3


10^3/uL


 


Basophils # (Auto)


 0.1 


 


 


 0.0-0.1


10^3/uL


 


Immature Granulocyte # (Auto)


 0.0 


 


 


 0.0-0.1


10^3/uL


 


D-Dimer


 2.24 H


 


 


 0.00-0.49


UG/ML


 


Sodium Level 141    135-145  MMOL/L


 


Potassium Level 3.7    3.6-5.0  MMOL/L


 


Chloride Level 107      MMOL/L


 


Carbon Dioxide Level 22    21-32  MMOL/L


 


Anion Gap 12    5-14  MMOL/L


 


Blood Urea Nitrogen 17    7-18  MG/DL


 


Creatinine


 1.51 H


 


 


 0.60-1.30


MG/DL


 


Estimat Glomerular Filtration


Rate 40 


 


 


  





 


BUN/Creatinine Ratio 11     


 


Glucose Level 95      MG/DL


 


Calcium Level 8.8    8.5-10.1  MG/DL


 


Corrected Calcium 9.0    8.5-10.1  MG/DL


 


Total Bilirubin 1.5 H   0.1-1.0  MG/DL


 


Aspartate Amino Transf


(AST/SGOT) 16 


 


 


 5-34  U/L





 


Alanine Aminotransferase


(ALT/SGPT) 28 


 


 


 0-55  U/L





 


Alkaline Phosphatase 56      U/L


 


Troponin I 0.034 H   <0.028  NG/ML


 


C-Reactive Protein High


Sensitivity 1.19 H


 


 


 0.00-0.50


MG/DL


 


B-Type Natriuretic Peptide 497.0 H   <100.0  PG/ML


 


Total Protein 6.7    6.4-8.2  GM/DL


 


Albumin 3.8    3.2-4.5  GM/DL


 


Procalcitonin 0.05    <0.10  NG/ML


 


Thyroid Stimulating Hormone


(TSH) 3.41 


 


 


 0.35-4.94


UIU/ML


 


Urine Color  YELLOW    


 


Urine Clarity  CLEAR    


 


Urine pH  5.5   5-9  


 


Urine Specific Gravity  1.020   1.016-1.022  


 


Urine Protein  NEGATIVE   NEGATIVE  


 


Urine Glucose (UA)  NEGATIVE   NEGATIVE  


 


Urine Ketones  NEGATIVE   NEGATIVE  


 


Urine Nitrite  NEGATIVE   NEGATIVE  


 


Urine Bilirubin  NEGATIVE   NEGATIVE  


 


Urine Urobilinogen  0.2   < = 1.0  MG/DL


 


Urine Leukocyte Esterase  TRACE H  NEGATIVE  


 


Urine RBC (Auto)  NEGATIVE   NEGATIVE  


 


Urine RBC  NONE    /HPF


 


Urine WBC  NONE    /HPF


 


Urine Squamous Epithelial


Cells 


 2-5 


 


  /HPF





 


Urine Crystals  NONE    /LPF


 


Urine Bacteria  NEGATIVE    /HPF


 


Urine Casts  NONE    /LPF


 


Urine Mucus  NEGATIVE    /LPF


 


Urine Culture Indicated  NO    








My Orders





Orders - MAIN GORDON MD


Ed Iv/Invasive Line Start (10/6/20 02:55)


Ekg Tracing (10/6/20 02:55)


Monitor-Rhythm Ecg Trace Only (10/6/20 02:55)


Chest 1 View, Ap/Pa Only (10/6/20 02:55)


Ed Iv/Invasive Line Start (10/6/20 02:55)


Lactated Ringers (Lr 1000 Ml Iv Solution (10/6/20 02:55)


Cbc With Automated Diff (10/6/20 02:55)


Comprehensive Metabolic Panel (10/6/20 02:55)


Hs C Reactive Protein (10/6/20 02:55)


Fibrin Degradation Products (10/6/20 02:55)


Procalcitonin (Pct) (10/6/20 02:55)


Thyroid Stimulating Hormone (10/6/20 02:55)


Troponin I (10/6/20 02:55)


Ua Culture If Indicated (10/6/20 03:19)


BNP (10/6/20 03:31)


Fentanyl  Injection (Sublimaze Injection (10/6/20 04:07)


Ondansetron Injection (Zofran Injectio (10/6/20 04:15)


Enoxaparin Injection (Lovenox Injection) (10/6/20 04:30)


Albuterol Inhaler (Ventolin Hfa) (10/6/20 06:00)


Covid 19 Inhouse Test (10/6/20 04:31)


Lung Scan V And P (Vq) (10/6/20 04:40)





Medications Given in ED





Current Medications








 Medications  Dose


 Ordered  Sig/Remi


 Route  Start Time


 Stop Time Status Last Admin


Dose Admin


 


 Enoxaparin Sodium  90 mg  ONCE  ONCE


 SC  10/6/20 04:30


 10/6/20 04:31 DC 10/6/20 04:55


90 MG


 


 Lactated Ringer's  1,000 ml @ 


 0 mls/hr  Q0M ONCE


 IV  10/6/20 02:55


 10/6/20 02:57 DC 10/6/20 03:04


1,000 MLS/HR


 


 Ondansetron HCl  4 mg  ONCE  ONCE


 IVP  10/6/20 04:15


 10/6/20 04:16 DC 10/6/20 04:19


4 MG








Vital Signs/I&O











 10/6/20 10/6/20





 02:45 04:24


 


Temp 36.3 


 


Pulse 135 122


 


Resp 24 20


 


B/P (MAP) 130/104 (113) 134/95 (108)


 


Pulse Ox  95


 


O2 Delivery Room Air Nasal Cannula


 


O2 Flow Rate  2.00





Capillary Refill : Less Than 3 Seconds








Blood Pressure Mean:                    113








Progress Note :  


Progress Note


Seen and evaluated. IV, labs, chest x-ray and EKG ordered. UA ordered. LR 1 L 

bolus. Monitor patient. 0437: I did discuss the case with Dr. Shipley, on-call 

for hospitalist. Patient has markedly elevated d-dimer with tachycardia and O2 

sats that are running at about 92%. Concern for PE. Patient does not have any 

leg pain or swelling but does have dilated heart and some findings on chest x-

ray of heart failure or pulmonary edema. This could be infiltrate bed white 

count is normal in progress and pro-calcitonin is low. Greatest concern is for 

pulmonary embolism. Lovenox 1 mg/kg subcutaneous ordered. We will continue that 

twice a day. Dr. Shipley accepts patient for admission pending VQ scan. Patient 

is unable to do CT angiogram due to renal dysfunction. Admit, observation 

status. Patient agrees with plan. Due to presentation and concerns for 

bronchitis, we will go ahead and get COVID-19 rapid swab and confirmatory 

testing if needed. Patient will be person under investigation.





ECG


Initial ECG Impression Date:  Oct 6, 2020


Initial ECG Impression Time:  02:55


Initial ECG Rate:  130


Initial ECG Rhythm:  S.Tach


Comment


Sinus tachycardia with left ventricular hypertrophy. Normal axis. No evidence of

ST elevation MI. Similar to previous of 4/10/19. Interpreted by me.





Diagnostic Imaging





   Diagonstic Imaging:  Xray


   Plain Films/CT/US/NM/MRI:  chest


Comments


Cardiomegaly with pulmonary vascular congestion


   Reviewed:  Reviewed by Me





Departure


Communication (Admissions)


Time/Spoke to Admitting Phy:  04:37





Impression





   Primary Impression:  


   Dyspnea


   Qualified Codes:  R06.02 - Shortness of breath


   Additional Impressions:  


   Tachycardia


   Person under investigation for COVID-19


Disposition:  09 ADMITTED AS INPATIENT


Condition:  Stable





Admissions


Decision to Admit Reason:  Admit from ER (General)


Decision to Admit/Date:  Oct 6, 2020


Time/Decision to Admit Time:  04:37





Departure-Patient Inst.


Referrals:  


MERI REYNA DO (PCP/Family)


Primary Care Physician











MAIN GORDON MD           Oct 6, 2020 03:38

## 2020-10-06 NOTE — NUR
SPOKE WITH THE PT (I CALLED HER ROOM PHONE) AND WENT THRU THE EXT MED HISTORY TO COMPLETE 
THE MED REC



ACCORDING TO THE PT THE ONLY PRESCRIPTION MEDS SHE IS CURRENTLY TAKING ARE ALLOPURINOL AND 
SYMBICORT- THE PT HAS FINISHED THE ANTIBIOTIC AND STEROID THERAPY



OTC MEDS:

TYLENOL

BENADRYL

## 2020-10-06 NOTE — NUR
JOSE RAUL GARCIA admitted to room 413-1, with an admitting diagnosis of DYSPNEA, TACHYCARDIA, 
COVID PUI, on 10/06/20 from [place] via , accompanied by .JOSE RAUL GARCIA introduced to 
surroundings, call light, bed controls, phone, TV, temperature control, lights, meal times, 
smoking policy, visitor policy, side rail policy, bathrooms and showers.  Patient Rights 
given to patient in the handbook. JOSE RAUL GARCIA verbalizes understanding that [facility] 
is not responsible for the loss or damage to any personal effects or valuables that are kept 
in the patients posession during their hospitalization. JOSE RAUL GARCIA verbalizes 
understanding of Interdisciplinary Patient Education. Patient and/or family were informed 
about the Rapid Response Team and its purpose.

## 2020-10-07 NOTE — PROGRESS NOTE - HOSPITALIST
SHARONBRAD MED STUDENT 10/7/20 1342:


Subjective


HPI/CC On Admission


Date Seen by Provider:  Oct 7, 2020


Time Seen by Provider:  08:25


Subjective/Events-last exam


Patient states she is breathing much easier and can take a full breath. Only 

current complaint is mild pain over mid-upper chest with deep inspiration 

(states she believes pain is secondary to coughing so often over the last few 

days). denies any other complaints at this time.





Objective


Exam


Vital Signs





Vital Signs








  Date Time  Temp Pulse Resp B/P (MAP) Pulse Ox O2 Delivery O2 Flow Rate FiO2


 


10/7/20 13:18  120 21 111/81 (91) 95 Nasal Cannula 2.00 


 


10/7/20 08:00 36.5       


 


10/6/20 08:17        28





Capillary Refill : Less Than 3 SecondsLess Than 3 Seconds


General Appearance:  No Apparent Distress; No Anxious; Obese


HEENT:  No Photophobia, No Scleral Icterus (L), No Scleral Icterus (R)


Neck:  Normal Inspection, Non Tender


Respiratory:  Chest Non Tender, Lungs Clear, Normal Breath Sounds


Gastrointestinal:  No Organomegaly, No Pulsatile Mass, Non Tender


Extremity:  Normal Inspection, Non Tender, No Calf Tenderness, No Pedal Edema


Neurologic/Psychiatric:  Alert, No Motor/Sensory Deficits, Normal Mood/Affect


Skin:  Normal Color, Warm/Dry





Results/Procedures


Lab


Laboratory Tests


10/7/20 05:03








Patient resulted labs reviewed.





Assessment/Plan


Assessment and Plan


Assess & Plan/Chief Complaint


Assessment


-Dyspnea 


-Hypoxia


-Hypotension


-Sinus Tachycardia


-Nonischemic LVH 


-Elevated BNP


-Elevated D-Dimer


-EF 30%


-Mild CAD without obstruction


-Mitral Regurgitation


-Right lower lobe Pneumonia


-CKD


-CHF


-Gout


-Elevated glucose


-Nonischemic LVH


-Hx recent bronchitis


-Hx gout


-Hx recent pneumonia, COVID negative+








Plan


-[facility]


-IV Lasix


-Metoprolol





Clinical Quality Measures


DVT/VTE Risk/Contraindication:


Risk Factor Score Per Nursin


RFS Level Per Nursing on Admit:  2=Moderate





ZAHIDA ENRIQUEZ DO 10/8/20 0532:


Subjective


Subjective/Events-last exam


Pt feeling much better


Had a coughing fit two days ago and a month ago had pneumonia but since that 

time she has been SOB


VQ scan obtained showing no pulmonary emboli 


Consulted cardiology due to elevated BNP


Pt was found to have an echocardiogram ejection fraction of 30%, she will 

undergo cardiac catheterization today


We will continue IV Lasix for volume overload





Objective


Exam


General Appearance:  No Apparent Distress, WD/WN, Chronically ill


Respiratory:  Lungs Clear


Cardiovascular:  Regular Rate, Rhythm





Assessment/Plan


Assessment and Plan


Assess & Plan/Chief Complaint


PNA w/u mostly c/w pulmonary edema





Supervisory-Addendum Brief


Verification & Attestation


Participated in pt care:  history, MDM, physical


Personally performed:  exam, history, MDM, supervision of care


Care discussed with:  Medical Student


Procedures:  n/a


Results interpretation:  Verified all documentation


Verification and Attestation of Medical Student E/M Service





A medical student performed and documented this service in my presence. I 

reviewed and verified all information documented by the medical student and made

modifications to such information, when appropriate. I personally performed the 

physical exam and medical decision making. 





 Zahida Enriquez, Oct 8, 2020,05:30











BRAD HERRERA MED STUDENT    Oct 7, 2020 13:42


ZAHIDA ENRIQUEZ DO                 Oct 8, 2020 05:32

## 2020-10-07 NOTE — CARDIOLOGY PROGRESS NOTE
Subjective


Date Seen by Provider:  Oct 7, 2020


Time Seen by Provider:  10:04


Subjective/Events-last exam


Patient is a 35 y/o female with hx of CKD. Presented to the ER with complaints 

of chest pain and dyspnea awakening her from sleep last night. Reports recent 

diagnosis of bronchitis several weeks ago. Reports ongoing dypsnea and 

nonproductive cough. Reports orthopnea and complaining of generalized fatigue 

and malaise since August. Denies any active chest pain. Denies any F/C/NS. 

Denies dizziness or lightheadedness. No ETOH or illicit drug use. Denies any hx 

of CAD or CHF.


Review of Systems


General:  No Chills, No Night Sweats; Fatigue; No Malaise, No Appetite, No Other


HEENT:  No Head Aches, No Visual Changes, No Eye Pain, No Ear Pain, No 

Dysphasia, No Sinus Congestion, No Post Nasal Drip, No Sore Throat, No Other


Pulmonary:  No Dyspnea, No Cough, No Pleuritic Chest Pain, No Other


Cardiovascular:  No: Chest Pain, Palpitations, Orthopnea, Paroxysmal Noc. 

Dyspnea, Edema, Lt Headedness, Other





Objective-Cardiology


Exam


Last Set of Vital Signs





Vital Signs








 10/6/20 10/7/20 10/7/20 10/7/20





 08:17 08:00 14:20 14:40


 


Temp  36.5  


 


Pulse    124


 


Resp    23


 


B/P (MAP)    105/74 (84)


 


Pulse Ox    93


 


O2 Delivery    Room Air


 


O2 Flow Rate   2.00 


 


FiO2 28   





Capillary Refill : Less Than 3 SecondsLess Than 3 Seconds


I&O











Intake and Output 


 


 10/7/20





 00:00


 


Intake Total 4133 ml


 


Output Total 2800 ml


 


Balance 1333 ml


 


 


 


Intake Oral 3133 ml


 


IV Total 1000 ml


 


Output Urine Total 2800 ml


 


Daily Weight Change No





 No








General:  Alert, Oriented X3, Cooperative


HEENT:  Atraumatic, PERRLA


Neck:  Supple, No JVD, No Thyromegaly


Lungs:  Clear to Auscultation, Normal Air Movement


Heart:  Normal S1, Normal S2, Other (tachycardic)


Abdomen:  Normal Bowel Sounds, Soft, No Tenderness, No Hepatosplenomegaly, No 

Masses


Extremities:  No Edema, Normal Pulses


Skin:  No Rashes, No Significant Lesion


Neuro:  Normal Speech, Cranial Nerves 3-12 NL


Psych/Mental Status:  Mental Status NL, Mood NL





Results


Lab


Laboratory Tests


10/7/20 05:03














A/P-Cardiology


Admission Diagnosis


Chest pain


CHF


Dyspnea


CKD





Assessment/Plan


Chest pain, nonspecific etiology, mildly elevated troponin, repeat troponin was 

negative. EKG showing LVH with sinus tachycardia. 2D Echo done this morning 

showing EF 30%. Planning for [facility] for further evaluation. 





Dyspnea on exertion, workup as discussed above, COVID negative





Acute LV systolic heart failure, unknown etiology, ischemic vs nonischemic 

cardiomyopathy, planning for LHC





Sinus tachycardia, started on beta blocker, continue to monitor. 





History of recent bronchitis, questionable RLL infiltrate on CXR, Lung scan done

yesterday was negative





CKD, follows with nephrology in [facility] of gout














Thank you for allowing us to participate in the management of Ms Wisdom. This is

Mindy Clay PA-C, as a scribe for Dr. Macias. 


Patient was seen and evaluated with Mindy, examination performed, management 

plan was discussed, agree with the current scribed note, I made few changes to 

the note using Italic font


Patient underwent cardiac catheterization showing nonischemic cardiomyopathy


She is borderline hypotensive, I am decreasing beta blocker dose to metoprolol 

12.5 mg twice a day and add Entresto with close monitoring to her renal function


Planning to initiate LifeVest





Clinical Quality Measures


DVT/VTE Risk/Contraindication:


Risk Factor Score Per Nursin


RFS Level Per Nursing on Admit:  2=Moderate











MINDY KEE   Oct 7, 2020 10:04


OTTO MACIAS MD               Oct 7, 2020 15:31

## 2020-10-07 NOTE — NUR
TESHA WHITLEY [facility] CALLED TO LET ME KNOW THAT SHE CAN BE HERE TOMORROW 10/8 BETWEEN 8-9 AM FOR 
LIFE VEST FITTING AND THAT THERE NEEDS TO BE A SECOND PERSON WITH PT FOR EDUCATION PURPOSE. 
TESHA PHONE NUMBER [phone]. SUPERVISOR NOTIFIED ABOUT THE REQUEST AND TOLD ME SHE IS 
GOING TO CALLED ME BACK WITH AND ANSWERS. ENRIQUE RED AGREED TO A SECOND PERSON WITH PT. 
CALLED TESHA BACK AND LET HER KNOW THAT PT'S  CAN BE HERE AT THE TIME.

## 2020-10-07 NOTE — NUR
CM/SS:  Visited with pt as to her plan for discharge and the process of getting her Life 
Vest from [facility] 



Plan:  Pt is from home and lives with her  and children in [place], [place] and will return 
there



Summary:  Pt recently returned from the Heart Cath Lab and is needing to get a Life Vest.  
Juan Jose has made contact and needed additional documents.



Additional documents are faxed to Juan Jose at [phone] attn to Gaby - 



Pt is anticipated to discharge to home on tomorrow. 



Telephone call to Brit  [phone] with Juan Jose.  She is able to call the pt and talk with 
her about the process and the vest.  Pt cell phone number is verified.  



This worker will follow up.

## 2020-10-07 NOTE — NUR
PT ARRIVED TO FLOOR FROM  RECOVERY, R GROIN SITE SOFT/NON-TENDER, RLE WARM/GOOD PEDAL PULSES 
2+.  PT DENIES NEEDS AT THIS TIME, HOB 30 DEGREES, PT INFORMED OF BEDREST ORDER UNTIL 1640.  
WILL CONTINUE TO MONITOR

## 2020-10-07 NOTE — DIAGNOSTIC IMAGING REPORT
EXAMINATION: Chest 2 view



HISTORY: Pneumonia.



COMPARISON: 10/06/2020.



FINDINGS: 



There is a left lower lobe airspace opacity consistent with

pneumonia. No pneumothorax. There is a small left pleural

effusion. Heart is enlarged.



IMPRESSION: 



1. Small left pleural effusion and left base airspace opacity

consistent with pneumonia.



Dictated by: 



  Dictated on workstation # [id number]

## 2020-10-07 NOTE — CARDIAC CATH REPORT
Cardiac Cath Report


Physician (s)/Assistant (s)


Physician


OTTO BOYCE MD





Pre-Procedure Diagnosis


Pre-Procedure Diagnosis:  congestive heart failure





Post-Procedure Note


Procedure Start Date:  Oct 7, 2020


Name of Procedure:  


Left heart catheterization


Findings/Procedure Note


PROCEDURE NOTE:


34-year-old lady admitted with shortness of breath, decompensated congestive 

heart failure left ventricular systolic dysfunction, decided to proceed with 

coronary angiogram to evaluate for any underlying causes for her heart failure. 




After explaining the procedure to the patient, all pros and cons were explained,

all questions were answered.  The patient signed the consent and then she  was 

placed on the cardiac catheterization laboratory. Groin was prepped SL fashion 

local anesthesia was used. Sheath placed in the artery. Garfield right and left 

catheter were used to access the coronary system.  Garfield right was use to 

prolapse of the left ventricular cavity and pressure was measured


Left ventriculogram was not done, pressure was measured


Aortic arch angiogram was not done


At the end of the procedure the sheath was removed. Closure device





FINDINGS:





Hemodynamics 


LV 90/33, end-diastolic pressure of 33


Aorta 88/66 mean of 75





ANATOMY:


Left Main is free of obstructive disease


Left Anterior Descending is free of obstructive disease


Left Circumflex is free of obstructive disease


Right Coronary Artery is free of obstructive disease


LV Gram was not done, pressure was measured





CONCLUSION:


1.  Nonischemic cardiomyopathy, elevated left ventricular end-diastolic pressure


2.  Mild coronary artery disease with no significant obstructive disease





DISCUSSION AND RECOMMENDATION:


Medical therapy will be maximized, patient will have a LifeVest and will 

evaluate tolerance and response


Anesthesia Type:  Conscious Sedation


Estimated blood loss (mL):  15 ml


Contrast Amount:  18 ml


Total Radiation Dose:  349 mGy





Post-Procedure Diagnosis


Post-operative diagnosis:  


Congestive heart failure, acute left ventricular systolic dysfunction,


nonischemic cardiomyopathy


Tachycardia


Dyspnea


Hypotension





(1) Person under investigation for COVID-19


(2) Tachycardia


(3) Dyspnea


Qualifiers:  


   Qualified Codes:  R06.02 - Shortness of breath











OTTO BOYCE MD               Oct 7, 2020 12:20

## 2020-10-08 NOTE — PHYSICAL THERAPY PROGRESS NOTE
Therapy Progress Note


PT eval orders received.  Patient is in bed and rouses easily.  Patient states 

she has been going to the restroom on her own and is feeling much better and no 

longer feels weak.  Patient demonstrates and walks about the room without 

difficulty or LOB or unsteadiness.  Patient appears to be independent with 

mobility at this time.  No PT eval performed, instructed patient to contact 

nurse if she feels the need for PT.











NAYE FRENCH PT                 Oct 8, 2020 14:24

## 2020-10-08 NOTE — NUR
CM/SS: Visited with pt as to her Life Vest and her plan for discharge 



Plan:  When deemed appropriately, Pt to return home with her life vest, no other identified 
services 



Summary:  Pt reports she is feeling better, and that she had a reaction to the medication 
that she had taken.  Pt reports getting her life vest fitted today and she and her  
had teaching on it.  She does report that she should be able to go home tomorrow.  Pt 
reports her  and cousin are taking  care of the children.  Pt has three special needs 
children, ages 9, 12, and 13.  One of the children is Autistic as well.  They are currently 
managing at home with the children.  Pt does not work and they currently collect disability 
on the spouse and children. She reports they are managing at this time.  She feels 
comfortable about having the life vest.  This worker will follow up.

## 2020-10-08 NOTE — NUR
Called to room by aide. Unable to get BP on pt. Pt complaining of back pain and dizziness. 
Unable to palpate pulse, but pt A/O X4. House Sup at bedside as well. Notified Dr. Macias, 
ordered to give 1L NS bolus. Notified Dr. Li as well. Notified pharmacy of reaction to 
Zithromax. Will continue to closely monitor pt.

## 2020-10-08 NOTE — PROGRESS NOTE - HOSPITALIST
BRAD HERRERA MED STUDENT 10/8/20 1507:


Subjective


HPI/CC On Admission


Date Seen by Provider:  Oct 8, 2020


Time Seen by Provider:  10:00


Subjective/Events-last exam


Patient states she was not experiencing symptoms until this morning shortly 

after receiving Azithromycin at which point she felt nauseous, experienced 

diarrhea, visual disturbances, dizziness and muscle cramps. Nursing staff were 

unable to measure a BP and called Dr. Macias who advised discontinuing 

Azithromycin and initiating a fluid bolus, patient reportedly felt rapid relief 

of symptoms; subsequent BP was taken at 88/60. Patient reports no current 

complaints and states she feels fine, denies CP, SOB, abdominal pain or headache

presently.





Focused Exam


Lactate Level


10/7/20 17:25: Lactic Acid Level 1.62


10/8/20 06:20: Lactic Acid Level 0.94








Objective


Exam


Vital Signs





Vital Signs








  Date Time  Temp Pulse Resp B/P (MAP) Pulse Ox O2 Delivery O2 Flow Rate FiO2


 


10/8/20 13:03  119      


 


10/8/20 12:00 36.5  18 99/71 (80) 93 Room Air  


 


10/7/20 14:20       2.00 


 


10/6/20 08:17        28





Capillary Refill : Less Than 3 SecondsLess Than 3 Seconds


General Appearance:  No Apparent Distress, WD/WN


HEENT:  No Scleral Icterus (L), No Scleral Icterus (R)


Neck:  Full Range of Motion, Normal Inspection, Non Tender


Respiratory:  Lungs Clear, Normal Breath Sounds, No Accessory Muscle Use


Cardiovascular:  Regular Rate, Rhythm, No Murmur; No Diastolic Murmur


Gastrointestinal:  No Organomegaly, No Pulsatile Mass, Non Tender


Back:  Normal Inspection


Extremity:  Normal Inspection, Normal Range of Motion, Non Tender


Neurologic/Psychiatric:  Alert, No Motor/Sensory Deficits; No Aphasia, No 

Disoriented


Skin:  Normal Color, Warm/Dry





Results/Procedures


Lab


Laboratory Tests


10/7/20 17:25








10/8/20 05:19








Patient resulted labs reviewed.





Assessment/Plan


Assessment and Plan


Assess & Plan/Chief Complaint


Assessment


-Dyspnea 


-Hypoxia


-Hypotension


-Sinus Tachycardia


-Nonischemic LVH 


-Elevated BNP


-Elevated D-Dimer


-EF 30%


-Mild CAD without obstruction


-Mitral Regurgitation


-Right lower lobe Pneumonia


-CKD


-CHF


-Gout


-Elevated glucose


-Nonischemic LVH


-Hx recent bronchitis


-Hx gout


-Hx recent pneumonia, COVID negative+








Plan


-IV Cefepime 


-Maintain IV Lasix


-Monitor tachycardia 


-Consult PT/OT


Recommendations per cardiology: 


-Life vest ordered, increase Lopressor, start on Entresto





Clinical Quality Measures


DVT/VTE Risk/Contraindication:


Risk Factor Score Per Nursin


RFS Level Per Nursing on Admit:  2=Moderate





ZAHIDA ENRIQUEZ DO 10/8/20 2303:


Supervisory-Addendum Brief


Verification & Attestation


Participated in pt care:  history, MDM, physical


Personally performed:  exam, history, MDM, supervision of care


Care discussed with:  Medical Student


Procedures:  n/a


Results interpretation:  Verified all documentation


Verification and Attestation of Medical Student E/M Service





A medical student performed and documented this service in my presence. I 

reviewed and verified all information documented by the medical student and made

modifications to such information, when appropriate. I personally performed the 

physical exam and medical decision making. 





 Zahida Enriquez, Oct 8, 2020,23:03











BRAD HERRERA MED STUDENT    Oct 8, 2020 15:07


ZAHIDA ENRIQUEZ DO                 Oct 8, 2020 23:03

## 2020-10-08 NOTE — OCCUPATIONAL THERAPY EVAL
OT Evaluation-General/PLF


Medical Diagnosis


Admission Date


Oct 6, 2020 at 04:42


Medical Diagnosis:  Chronic cough, bronchitis, CHF


Onset Date:  Oct 6, 2020





Therapy Diagnosis


Therapy Diagnosis:  Weakness





Height/Weight


Height (Feet):  5


Height (Inches):  4.50


Weight (Pounds):  243





Precautions


Precautions/Isolations:  Contact Isolation





Weight Bear Status


Weight Bearing Restriction:  Weight Bearing/Tolerated





Referral


Physician:  Dr. Li


Referral Reason:  Activity Tolerance, Self Care, Evaluation/Treatment, 

Strengthening/ROM





Medical History


Additional Medical History


Gout, pneumonia, EF 30%


Current History


Pt. has been fit for Life Vest.


Reviewed History:  Yes





Social History


Home:  Single Level


Current Living Status:  Children





ADL-Prior Level of Function


SCALE: Activities may be completed with or without assistive devices.





6-Indepedent-patient completes the activity by him/herself with no assistance 

from a helper.


5-Set-up or Clean-up Assistance-helper sets up or cleans up; patient completes 

activity. [place] assists only prior to or  


    following the activity.


4-Supervision or Touching Assistance-helper provides verbal cues and/or 

touching/steadying and/or contact guard assistance as patient completes 

activity. Assistance may be provided   


    throughout the activity or intermittently.


3-Partial/Moderate Assistance-helper does LESS THAN HALF the effort. [place] 

lifts, holds or supports trunk or limbs, but provides less than half the effort.


2-Substantial/Maximal Assistance-helper does MORE THAN HALF the effort. [place] 

lifts or holds trunk or limbs and provides more than half the effort.


4-Girnzrssh-lpnseq does ALL the effort. Patient does none of the effort to 

complete the activity. Or, the assistance of 2 or more helpers is required for 

the patient to complete the  


    activity.


If activity was not attempted, code reason:


7-Patient Refused.


9-Not Applicable-not attempted and the patient did not perform the activity befo

re the current illness, exacerbation or injury.


10-Not Attempted due to Environmental Limitations-(lack of equipment, weather re

straints, etc.).


88-Not Attempted due to Medical Conditions or Safety Concerns.


ADL PLOF Comments


Pt. was independent  with all tasks.  Pt. is mother of 3 children.  They are 

ages 13, 12, and 9.  She states that she was ill in August, and since then has 

had a cough and was diagnosed with Bronchitis.  She came to ER with SOA and it 

was found that she has CHF.  She has an EF of 30% and has been fit for a life 

vest.  On evaluation, pt. reports that she had an allergic reaction to a medicat

ion, and that is what has caused her heart issues.


Self Care:  Independent


Functional Cognition:  Independent





OT Current Status


Subjective


No pain reported.  Pt. states that she is feeling much better.





Mental Status/Objective


Patient Orientation:  Person, Place, Time, Situation





ADL-Treatment


Eating (QC):  6


On/Off Footwear (QC):  6 (per pt.)


Toileting Hygiene (QC):  6 (per pt)





Other Treatments


OT order received, chart reviewed.  When OT entered room, pt. was up ad maría in 

her room and straightening up her bed.  OT talked with pt. about how she is 

feeling, and any issues she is having currently.  Pt. reports that she is taking

self to the bathroom, and is having no difficulty with cleansing self.  She is 

not having difficulty donning slipper socks, and plans to take a shower later.  

She did not want to do one at this time.  Pt. does not need a walker at this 

time as well, and states that she is having no balance issues.  She reports that

she is feeling better overall, and has been fit for a life vest. OT talked with 

pt. about energy conservation techniques, and taking her time when she does go 

home.  She verbalizes understanding. Pt. does not need OT services at this time,

and is demonstrating physical issues pertinent to her current condition, (EF-

30%).





Education


OT Patient Education:  Correct positioning, Modified ADL techniques, Progress 

toward Goal/Update tx plan, Purpose of tx/functional activities, Reviewed 

precautions, Rehab process, Transfer techniques


Teaching Recipient:  Patient


Teaching Methods:  Demonstration, Discussion


Response to Teaching:  Verbalize Understanding, Return Demonstration





OT Long Term Goals


Long Term Goals


Time Frame:  Oct 8, 2020


Additional Goals:  1-Demonstrate ADL Tasks, 2-Verbalize Understanding


1=Demonstrate adherence to instructed precautions during ADL tasks.


2=Patient will verbalize/demonstrate understanding of assistive 

devices/modifications for ADL.


3=Patient will improve strength/tolerance for activity to enable patient to 

perform ADL's.





OT Education/Plan


Problem List/Assessment


Assessment:  No Skilled OT Needs ID'd





Discharge Recommendations


Plan/Recommendations:  Discharge/Goals Met


Therapy Discharge Recommendati:  Home & Family





Treatment Plan/Plan of Care


Treatment,Training & Education:  Yes


Plan of Care:  OTHER


Treatment Duration:  Oct 8, 2020


Frequency:  1 time per week


Estimated Hrs Per Day:  .25 hour per day


Agreement:  Yes


Rehab Potential:  Good





Time/GCodes


Start Time:  14:05


Stop Time:  14:15


Total Time Billed (hr/min):  10


Billed Treatment Time


1, MONCHO REYES OT            Oct 8, 2020 15:32

## 2020-10-08 NOTE — CARDIOLOGY PROGRESS NOTE
Subjective


Date Seen by Provider:  Oct 8, 2020


Time Seen by Provider:  08:15


Subjective/Events-last exam


Denies any chest pain, reports mild dyspnea with exertion. Noted to be 

tachycardic.


Review of Systems


General:  No Chills, No Night Sweats; Fatigue, Malaise; No Appetite, No Other


HEENT:  No Head Aches, No Visual Changes, No Eye Pain, No Ear Pain, No 

Dysphasia, No Sinus Congestion, No Post Nasal Drip, No Sore Throat, No Other


Pulmonary:  No Dyspnea, No Cough, No Pleuritic Chest Pain, No Other


Cardiovascular:  No: Chest Pain, Palpitations, Orthopnea, Paroxysmal Noc. 

Dyspnea, Edema, Lt Headedness, Other





Focused Exam


Lactate Level


10/7/20 17:25: Lactic Acid Level 1.62


10/8/20 06:20: Lactic Acid Level 0.94





Lactic Acid Level








Objective-Cardiology


Exam


Last Set of Vital Signs





Vital Signs








 10/6/20 10/7/20 10/8/20





 08:17 14:20 16:45


 


Temp   36.8


 


Pulse   116


 


Resp   20


 


B/P (MAP)   101/72 (82)


 


Pulse Ox   97


 


O2 Delivery   Room Air


 


O2 Flow Rate  2.00 


 


FiO2 28  





Capillary Refill : Less Than 3 SecondsLess Than 3 Seconds


I&O











Intake and Output 


 


 10/8/20





 00:00


 


Intake Total 3295 ml


 


Output Total 4200 ml


 


Balance -905 ml


 


 


 


Intake Oral 2295 ml


 


IV Total 1000 ml


 


Output Urine Total 4200 ml








General:  Alert, Oriented X3, Cooperative


HEENT:  Atraumatic, PERRLA


Neck:  Supple, No JVD, No Thyromegaly


Lungs:  Clear to Auscultation, Normal Air Movement


Heart:  Normal S1, Normal S2, Other (tachycardic)


Abdomen:  Normal Bowel Sounds, Soft, No Tenderness, No Hepatosplenomegaly, No 

Masses


Extremities:  No Edema, Normal Pulses


Skin:  No Rashes, No Significant Lesion


Neuro:  Normal Speech, Cranial Nerves 3-12 NL


Psych/Mental Status:  Mental Status NL, Mood NL





Results


Lab


Laboratory Tests


10/7/20 17:25








10/8/20 05:19














A/P-Cardiology


Admission Diagnosis


Chest pain


CHF


Dyspnea


CKD





Assessment/Plan


Chest pain, nonspecific etiology, mildly elevated troponin, repeat troponin was 

negative. EKG showing LVH with sinus tachycardia. 2D Echo showing EF 30%. 

Underwent cardiac catheterization yesterday revealing nonobstructive disease. 





CHF, acute LV systolic dysfunction, nonischemic cardiomyopathy with EF 30%. 

Started on Entresto, Lopressor. Life vest ordered. 





Dyspnea on exertion, workup as discussed above, COVID negative





Sinus tachycardia, I will increase Lopressor with parameters, continue to 

monitor.. 





History of recent bronchitis, RLL infiltrate on CXR, on antibiotics, management 

per medical services. 





CKD, follows with nephrology in [facility] of gout





Bed bug infestation, on isolation precautions





Patient was seen and evaluated with Mindy, examination performed, management 

plan was discussed, agree with the current scribed note, I made few changes to 

the note using Italic font


Patient has been doing well borderline hypotensive, I will change Entresto to 

losartan and evaluate tolerance and response


Had an episode of severe hypotension this morning probably secondary to 

Zithromax.  Received IV fluid, feeling better.








Clinical Quality Measures


DVT/VTE Risk/Contraindication:


Risk Factor Score Per Nursin


RFS Level Per Nursing on Admit:  2=Moderate











MINDY KEE  Oct 8, 2020 8:19 am


OTTO BOYCE MD              Oct 8, 2020 4:47 pm

## 2020-10-09 NOTE — NUR
pt does not qualify for home o2. pt did not dip down to 88% on O2 saturation.

-------------------------------------------------------------------------------

Addendum: 10/09/20 at 1506 by CORBY PERRY RT

-------------------------------------------------------------------------------

Amended: Links added.

## 2020-10-09 NOTE — NUR
PT OKAY TO DISCHARGER PER DR. BOYCE. PT WILL NEED TO FOLLOW UP IN 2 WEEKS. 



 MEDICATION ALSO CLARIFIED IN  PROGRESS NOTE- PT IS NOT ON ENTRESTO. WILL GO HOME WITH LIFE 
VEST.

## 2020-10-09 NOTE — NUR
0125-PT WALKING THE HALLS

0134-PT REPORTS RIGHT SHOULDER PAIN 6/10 NUMERICALLY WITH DEEP BREATHS & COUGHING, PT STATES 
SHE IS SOA & IS REQUESTING PRN O2 & INHALER. PT REPORTS THAT THIS PAIN OCCURS OCCASIONALLY 
SINCE BEING DIAGNOSED WITH PNEUMONIA,  PT O2 ON RA 90%-1L 02 NC PLACE ON PT, RESPIRATIONS 22 
PER MINUTE, , PRN ALBUTEROL INHALER GIVEN, PRN TORADOL GIVEN.  

0136-SPO2 95%  PER MINUTE

0150-PT SITTING UP ON SIDE OF BED REQUESTING A CUP OF HOT TEA WHICH WAS PROVIDED

0210-PT SITTING UP IN RECLINER, OXYGEN OFF, PT STATES THAT SHE WENT TO THE RESTROOM AND 
COUGHED UP A "MEDIUM" AMOUNT OF CLEAR SPUTUM. PT STATES THAT SHE FEELS BETTER & DENIES PAIN 
AT THIS TIME. PT STATES THAT SHE THINKS WALKING THE HALLS WAS TO MUCH ACTIVITY AT THIS TIME 
FOR HER. RESPIRATIONS 18, , SPO2 SAT 95%.

## 2020-10-09 NOTE — PROGRESS NOTE
BRAD HERRERA MED STUDENT 10/9/20 1313:


Progress Note


Judith Wisdom is a 33 y/o female with hx of gout and recent bronchitis presents 

for SOB and associated chest pain. Patient stated that she had diagnosis of  

pneumonia about 1 month ago and has since felt SOB, reporting she had a coughing

fit to the point she felt chest pain and could not catch her breath which 

prompted her ER visit. Patient was given 1L fluid bolus and had VQ scan ordered 

(unable to get CT angiogram secondary to renal insufficiency). Patient was 

admitted, cxr was consistent with PNA and patient was treated with abx. 

Cardiology was consulted secondary to chest pain which prompted subsequent 

cardiac catheterization: Congestive heart failure, acute left ventricular 

systolic dysfunction,


nonischemic cardiomyopathy. Patient reports no current complaints, states she 

can breathe freely and is able to ambulate without assistance. Patient will be 

d/c home with halter monitor to monitor symptoms having been cleared by 

cardiology.





ZAHIDA LI DO 10/10/20 0554:


Supervisory-Addendum Brief


Verification & Attestation


Participated in pt care:  history, MDM, physical


Personally performed:  exam, history, MDM, supervision of care


Care discussed with:  Medical Student


Procedures:  n/a


Results interpretation:  Verified all documentation


Verification and Attestation of Medical Student E/M Service





A medical student performed and documented this service in my presence. I 

reviewed and verified all information documented by the medical student and made

modifications to such information, when appropriate. I personally performed the 

physical exam and medical decision making. 





 Zahida Li, Oct 10, 2020,05:54











BRAD HERRERA MED STUDENT    Oct 9, 2020 13:13


ZAHIDA LI DO                Oct 10, 2020 05:54

## 2020-10-09 NOTE — CARDIOLOGY PROGRESS NOTE
Subjective


Date Seen by Provider:  Oct 9, 2020


Time Seen by Provider:  11:34


Subjective/Events-last exam


Patient is sitting comfortably in a chair, feeling better


Review of Systems


General:  No Chills, No Night Sweats, No Fatigue, No Malaise, No Appetite, No 

Other


HEENT:  No Head Aches, No Visual Changes, No Eye Pain, No Ear Pain, No 

Dysphasia, No Sinus Congestion, No Post Nasal Drip, No Sore Throat, No Other


Pulmonary:  No Dyspnea, No Cough, No Pleuritic Chest Pain, No Other


Cardiovascular:  No: Chest Pain, Palpitations, Orthopnea, Paroxysmal Noc. 

Dyspnea, Edema, Lt Headedness, Other





Focused Exam


Lactate Level


10/7/20 17:25: Lactic Acid Level 1.62


10/8/20 06:20: Lactic Acid Level 0.94








Objective-Cardiology


Exam


Last Set of Vital Signs





Vital Signs








 10/6/20 10/9/20 10/9/20





 08:17 08:00 10:46


 


Temp  36.2 


 


Pulse  128 


 


Resp  18 


 


B/P (MAP)  119/93 (102) 


 


Pulse Ox   94


 


O2 Delivery   Nasal Cannula


 


O2 Flow Rate   1.00


 


FiO2 28  





Capillary Refill : Less Than 3 SecondsLess Than 3 Seconds


I&O











Intake and Output 


 


 10/9/20





 00:00


 


Intake Total 2420 ml


 


Output Total 2650 ml


 


Balance -230 ml


 


 


 


Intake Oral 1660 ml


 


IV Total 760 ml


 


Output Urine Total 2650 ml


 


# Voids 2


 


# Bowel Movements 2








General:  Alert, Oriented X3, Cooperative


HEENT:  Atraumatic, PERRLA


Neck:  Supple, No JVD, No Thyromegaly


Lungs:  Clear to Auscultation, Normal Air Movement


Heart:  Normal S1, Normal S2, Other (tachycardic)


Abdomen:  Normal Bowel Sounds, Soft, No Tenderness, No Hepatosplenomegaly, No 

Masses


Extremities:  No Edema, Normal Pulses


Skin:  No Rashes, No Significant Lesion


Neuro:  Normal Speech, Cranial Nerves 3-12 NL


Psych/Mental Status:  Mental Status NL, Mood NL





Results


Lab


Laboratory Tests


10/9/20 05:50














A/P-Cardiology


Admission Diagnosis


Chest pain


CHF


Dyspnea


CKD





Assessment/Plan


Chest pain, nonspecific etiology, mildly elevated troponin, repeat troponin was 

negative. EKG showing LVH with sinus tachycardia. 2D Echo showing EF 30%. 

Underwent cardiac catheterization yesterday revealing nonobstructive disease. 





CHF, acute LV systolic dysfunction, nonischemic cardiomyopathy with EF 30%. 

Started on Entresto, Lopressor. Life vest ordered. 





Dyspnea on exertion, workup as discussed above, COVID negative





Sinus tachycardia, maintained on low-dose beta blockers.  Cannot tolerate higher

doses due to hypotension





History of recent bronchitis, RLL infiltrate on CXR, on antibiotics, management 

per medical services. 





CKD, follows with nephrology in [facility] of gout





Bed bug infestation, on isolation precautions





Clinical Quality Measures


DVT/VTE Risk/Contraindication:


Risk Factor Score Per Nursin


RFS Level Per Nursing on Admit:  2=Moderate











OTTO BOYCE MD               Oct 9, 2020 11:35

## 2021-02-03 NOTE — ANESTHESIA-GENERAL POST-OP
MAC


Significant Intra-Op Events


Notes


pt tolerated sedation well





Patient Condition


Mental Status/LOC:  Same as Preop


Cardiovascular:  Satisfactory


Nausea/Vomiting:  Absent


Respiratory:  Satisfactory


Pain:  Controlled


Complications:  Absent





Post Op Complications


Complications


None





Follow Up Care/Instructions


Patient Instructions


None needed.





Anesthesiology Discharge Order


Discharge Order


Patient is doing well, no complaints, stable vital signs, no apparent adverse 

anesthesia problems.   


No complications reported per nursing.











VERONIKA LAGUNA CRNA             Feb 3, 2021 11:55

## 2021-02-03 NOTE — ICD IMPLANTATION
Single Chamber ICD Implant


DATE OF SERVICE:  35 female 





SINGLE CHAMBER ICD IMPLANTATION





PRIMARY PHYSICIAN:





REFERRING PHYSICIAN:





CARDIOLOGIST:


Otto Macias 





INDICATION:





PREOPERATIVE DIAGNOSES:


Congestive heart failure, chronic compensated left ventricular systolic 

dysfunction, nonischemic cardiomyopathy





POSTOPERATIVE DIAGNOSES:


Congestive heart failure





HISTORY:


ICD implantation is recommended.





PROCEDURE PERFORMED:


1.  Single-chamber ICD implantation.


2.  Implantable loop recorder explantation.


3.  Venogram.


4.  DFT testing.





COMPLICATIONS: None.


ESTIMATED BLOOD LOSS: 20 mL.


SPECIMENS: None.


ANESTHESIA: Conscious sedation.


ORAL ANTICOAGULATION: None.


FLUOROSCOPY TIME:


FLUOROSCOPY DOSE:


CONTRAST DOSE:





PROCEDURE DETAILS:


After all the questions were answered, an informed consent was taken.  All the 

risks and complication were explained in detail.  The patient was brought to the


EP lab.  The patient's right and left chest was prepped and draped in the usual 

sterile fashion.  A 2-inch horizontal incision was made 1 cm below the clavicle


and dissection carried down to the pectoralis fascia.  IV antibiotics were 

administered prior to first incision.  Under fluoroscopic guidance, access was 

gained in the axillary vein and a regular J-wire was placed.  We then introduced

a sheath into the axillary vein.  A  ICD lead was inserted.   This is a 

single-coiled ICD lead.  The RV lead was inserted across the tricuspid valve to 

an apical septal portion of the RV. The lead position was checked in [place]ic and ROWELL

view.  The screw was deployed and lead connected to the .  Good 

sensing and pacing thresholds were obtained.  Diaphragmatic pacing was ruled 

out.  The lead was secured with 2-0 Vicryl nonabsorbable sutures.  The lead was 

secured to the underlying muscle and fascia.  We then took an ICD generator and 

the lead was connected to the device in a hermetic fashion.  The device and it 

was placed in the pocket.  Aggressive irrigation with normal saline solution was

done.  Interrogation of the device revealed good integrity of the leads and 

connection.  The wound was closed using 2 layers.  The first layer was an 

interrupted 2-0 Vicryl.  The second layer was an uninterrupted 4-0 Vicryl 

suture.  Half inch Steri-Strips and a small dressing was then applied to the 

wound.  


DFT testing was done with anesthesia support. The induction mechanism was a T-

shock.  The first T-shock was at 13 usual and it was successful in terminating 

ventricular fibrillation.  Rhythm was restored.  Impedance was 36, charge time 

7.1, the past with a B to apex.





DEVICE INFORMATION:


COBALT XT VR MRI Serial [id number]


Lead [id number]





INTRAOPERATIVE DEVICE TESTING:


RV lead bipolar, R wave 9.4 mV, lead impedance 893, HVB impedance 32, HVX 

impedance 44, threshold at pacing 0.7 mV at 0.4 ms.


DFT testing, successful in terminating ventricular fibrillation with single 

shock at 30 J





DEVICE INTERROGATION IMMEDIATELY POSTOP: Same





PLAN:


The patient will be observed for 23 hours.  We will continue with two more 

dosages of IV antibiotics.  We will check a chest x-ray and interrogate the 

device in the morning.  An EKG will be done as well.  If everything checks out, 

the patient will be discharged tomorrow.





CONCLUSION:


1.  Successful implantation of single chamber ICD for primary prevention


2.  Successful DFT testing in terminating ventricular fibrillation





FINAL DIAGNOSIS:


Congestive heart failure, chronic compensated left ventricular systolic 

dysfunction, nonischemic cardiomyopathy


Hypertension


Hyperlipidemia











OTTO MACIAS MD               Feb 3, 2021 12:06

## 2021-02-03 NOTE — CARDIAC PROCEDURE NOTE-CS/ASA
Pre-Procedure Note


Pre-Op Procedure Note


H&P Reviewed


The H&P was reviewed, patient examined and no changes noted.


Date H&P Reviewed:  Feb 3, 2021


Time H&P Reviewed:  10:16





Conscious Sedation Pre-Proced


Time


10:16





ASA Score


3


For ASA 3 and 4: Consider anesthesia and medical clearance. Also, for patients

with a history of failed moderate sedation consider anesthesia.

















Airway 


 


Lungs 


 


Heart 


 


 ASA score


 


 ASA 1: a normal healthy patient


 


 ASA 2:  a patient with a mild systemic disease (mid diabetes, controlled 

hypertension, obesity 


 


x ASA 3:  a patient with a severe systemic disease that limits activity  

(angina, COPD, prior Myocardial infarction)


 


 ASA 4:  a patient with an incapacitating disease that is a constant threat to 

life (CHF, renal failure)


 


 ASA 5:  a moribund patient not expected to survive 24 hrs.  (ruptured aneurysm)


 


 ASA 6:  a declared brain-dead patient whose organs are being harvested.


 


 For emergent operations, add the letter E after the classification











Mallampati Classification


Grade 3





Sedation Plan


Analgesia, Amnesia, Plan communicated to team members, Discussed options with 

patient/fam, Discussed risks with patient/fam


The patient is an appropriate candidate to undergo the planned procedure, 

sedation, and anesthesia.





The patient immediately re-assessed prior to indication.











OTTO BOYCE MD               Feb 3, 2021 10:17

## 2021-02-03 NOTE — DIAGNOSTIC IMAGING REPORT
INDICATION: 

Pacemaker placement.



TIME OF EXAM: 

12:15 PM.



COMPARISON: 

Correlation is made with the prior chest from earlier this same

day.



FINDINGS:

A cardiac defibrillator has been placed with the lead tip in the

region of the right ventricle. The heart is enlarged. No

pneumothorax is identified. The lungs are clear. There is no

effusion.



IMPRESSION: 

Defibrillator placement. No pneumothorax is detected.



Dictated by: 



  Dictated on workstation # [id number]

## 2021-02-04 NOTE — CARDIOLOGY PROGRESS NOTE
Subjective


Date Seen by Provider:  Feb 4, 2021


Time Seen by Provider:  10:12


Subjective/Events-last exam


patient was seen at bedside laying down comfortably, no new complaint





Objective-Cardiology


Exam


Last Set of Vital Signs





Vital Signs








 2/3/21 2/4/21 2/4/21 2/4/21





 21:00 08:00 08:54 09:00


 


Temp  36.6  


 


Pulse  107  


 


Resp  20  


 


B/P (MAP)  121/95 (104)  


 


Pulse Ox    98


 


O2 Delivery   Room Air 


 


O2 Flow Rate 2.00   





Capillary Refill : Greater Than 3 Seconds


I&O











Intake and Output 


 


 2/3/21





 23:59


 


Intake Total 400 ml


 


Balance 400 ml


 


 


 


Intake Oral 400 ml


 


# Voids 2








General:  Alert, Oriented X3, Cooperative


HEENT:  Atraumatic, PERRLA


Neck:  Supple, No JVD, No Thyromegaly


Lungs:  Clear to Auscultation, Normal Air Movement


Heart:  Regular Rate, Normal S1, Normal S2, No Murmurs


Abdomen:  Normal Bowel Sounds, Soft, No Tenderness, No Hepatosplenomegaly, No 

Masses


Extremities:  No Clubbing, No Cyanosis, No Edema, Normal Pulses, No 

Tenderness/Swelling


Skin:  No Rashes, No Breakdown, No Significant Lesion


Neuro:  Normal Gait, Normal Speech, Strength at 5/5 X4 Ext, Normal Tone, Se

nsation Intact


Psych/Mental Status:  Mental Status NL, Mood NL





Results


Lab


Laboratory Tests


2/4/21 02:50














A/P-Cardiology


Admission Diagnosis


congestive heart failure


Single-chamber ICD implant


Hypotension





Assessment/Plan


congestive heart failure, chronic compensated left ventricular systolic 

dysfunction, nonischemic cardiomyopathy, maintained on ACE inhibitor and beta 

blockers and doing well





Status post single-chamber ICD implant for primary prevention.  Site is healing 

well.  Interrogation showed good sensing and capture activity





Borderline hypotension, tolerating medication well





BMI 35 discussed weight loss











OTTO BOYCE MD               Feb 4, 2021 10:13

## 2021-02-04 NOTE — DISCHARGE INST-POST CATH
Discharge Inst-CATH/EP


Problems Reviewed?:  Yes


Post Cardiac Cath/EP D/C Inst


Follow Up/Plan


Appointment with Dr Macias in one weeks


<b>CARDIAC CATH/EP PROCEDURE DISCHARGE INSTRUCTIONS</b>





ACTIVITY





* Go Home directly and rest.


* Limit activity of the leg (or wrist if it was used) for 7 days including 

aerobics, swimming,


   jogging, bicycling, etc.


* Restrict stair-climbing for 7 days if possible, if not, climb up with your 

non-cath leg, then


   bring together on the same step.


* Avoid lifting, pushing, pulling or excessive movement of the affected 

extremity for 7 days.


* Customary sexual activity may be resumed after 2 days-use caution not to use a

position  


   that strains or causes pain to the affected extremity.


* No driving for 24 hours.


* NO SMOKING. 


* Avoid straining for bowel movements for 7 days.


* Gentle walking on level ground is allowed.


* Returning to work will depend on the type of procedure and the results. Your 

doctor will discuss


   this with you.





CALL YOUR DOCTOR FOR ANY OF THE FOLLOWING:





*If bleeding from the puncture site occurs- Apply gentle pressure to site with 

clean cloth and call


   your doctor or EMS.


* If a knot or lump forms under the skin, increases in size, or causes pain.


* If bruising appears to be worsening or moving further down your leg instead of

disappearing.


* Temperature above 101 F.





CARE OF YOUR GROIN INCISION;





* Bruising or purple discoloration of the skin near the puncture site is common.


* You may shower only, no bathtub bathing for 5 days.  Be careful to avoid 

slipping as your


   leg may feel stiff.


* If a closure device was used on your femoral artery, please see the attached 

guide regarding


   care of the device and your leg.


* Leave dressing on FOR 24 hours.





CARE OF YOUR WRIST INCISION;





* Bruising or purple discoloration of the skin near the puncture site is common.


* You may shower.


* DO NOT submerge wrist.


* Leave dressing on FOR 24 hours.











OTTO MACIAS MD               Feb 4, 2021 05:50

## 2022-05-13 NOTE — ED COUGH/URI
General


Chief Complaint:  COVID19 Suspect/Confirmed


Stated Complaint:  COUGH, BODY ACHES, V/N,SOB


Source:  patient


Exam Limitations:  no limitations





History of Present Illness


Date Seen by Provider:  May 13, 2022


Time Seen by Provider:  22:25


Initial Comments


Patient is a 36-year-old female who presents to the emergency room today with a 

chief complaint of cough, body aches, fever noted today to 103, nausea and 

vomiting due to her cough and feeling short of breath.  Patient has a history of

hypertension/cardiomyopathy and follows with Dr. Macias.  This was diagnosed 

about a year ago.  She states her cough and shortness of breath of started 

within the last couple of days.  She hears a "rattling" when she lays flat.  No 

swelling in her lower extremities or cramping in her calves.  She denies burning

with urination or diarrhea.  She states her cough is occasionally productive.  

She states her right ear hurts a little bit.  She is COVID vaccinated.  No sore 

throat.  A little abdominal discomfort with vomiting.  No sick contacts that she

is aware of.  She states that she is compliant with her medications.  She does 

not smoke.  She does have to use inhalers occasionally for her breathing.  She 

denies chest pain except discomfort with cough.





All other review of systems reviewed and negative except as stated.


Timing/Duration:  yesterday


Severity/Quality:  moderate


Prior Episodes/Possible Cause:  no prior episodes


Modifying Factors:  Improves With Albuterol Inhaler ((makes her cough))


Associated Symptoms:  chest pain/soreness, cough, earache (right), fever/chills,

headache, lightheadedness, muscle aches, shortness of breath





Allergies and Home Medications


Allergies


Coded Allergies:  


     Penicillins (Verified  Allergy, Unknown, 10/6/20)


     azithromycin (Verified  Allergy, Unknown, 10/8/20)


     cefaclor (Verified  Allergy, Unknown, 08)





Patient Home Medication List


Home Medication List Reviewed:  Yes


Acetaminophen (Tylenol Extra Strength) 500 Mg Tablet, 1,000 MG PO Q6H PRN for 

PAIN-MILD (1-4), (Reported)


   Entered as Reported by: CATIA GREWAL on 10/6/20 3032


Allopurinol (Allopurinol) 300 Mg Tablet, 300 MG PO DAILY, (Reported)


   Entered as Reported by: TEOFILO FRASER on 10/6/20 0615


Budesonide/Formoterol Fumarate (Symbicort 160-4.5 Mcg Inhaler) 10.2 Gm 

Hfa.aer.ad, 2 PUFF INH BID, (Reported)


   Entered as Reported by: CATIA GREWAL on 10/6/20 174


Clindamycin HCl (Clindamycin HCl) 300 Mg Capsule, 300 MG PO Q6H


   Prescribed by: OTTO MACIAS on 21 0550


Diphenhydramine HCl (Benadryl) 25 Mg Capsule, 25-50 MG PO Q6H PRN for ALLERGY 

SYMPTOMS, (Reported)


   Entered as Reported by: CATIA GREWAL on 10/6/20 1749


Furosemide (Lasix) 20 Mg Tablet, 20 MG PO DAILY


   Prescribed by: MICHAEL VALADEZ on 22


Guaifenesin/Codeine (Robitussin Ac (Codeine) Syrup) 10 Ml Syrp, 10 ML PO Q8H PRN

for cough


   Prescribed by: MICHAEL VALADEZ on 22 005


Losartan Potassium (Losartan Potassium) 25 Mg Tablet, 25 MG PO DAILY


   Prescribed by: KAYLEEN ENRIQUEZ on 10/9/20 1236


Metoprolol Tartrate (Metoprolol Tartrate) 25 Mg Tablet, 12.5 MG PO BID


   Prescribed by: KAYLEEN ENRIQUEZ on 10/9/20 1236


Ondansetron (Ondansetron Odt) 4 Mg Tab.rapdis, 4 MG PO Q8H PRN for cough


   Prescribed by: MICHAEL VALADEZ on 22





Review of Systems


Review of Systems


Constitutional:  see HPI, fever, malaise


EENTM:  ear pain (right)


Respiratory:  cough, orthopnea, phlegm (occasional), short of breath


Gastrointestinal:  nausea, vomiting


Genitourinary:  no symptoms reported


Musculoskeletal:  muscle pain (muscle aches)


Skin:  no symptoms reported


Psychiatric/Neurological:  Headache





All Other Systems Reviewed


Negative Unless Noted:  Yes





Past Medical-Social-Family Hx


Patient Social History


Tobacco Use?:  No


Substance use?:  No


Alcohol Use?:  No





Immunizations Up To Date


Influenza Vaccine Up-to-Date:  No; Not Current


COVID19 Vaccine :  STATES HAS HAD 2 COVID VACCINES





Past Medical History


Surgeries:  Yes


 Section


Respiratory:  Yes


Asthma, COPD


Cardiac:  No


Neurological:  No


Genitourinary:  No


Gastrointestinal:  No


Musculoskeletal:  Yes


Gout


Endocrine:  No


HEENT:  No


Cancer:  No


Psychosocial:  No


Integumentary:  No


Blood Disorders:  No


Adverse Reaction/Blood Tranf:  No





Physical Exam





Vital Signs - First Documented




















Capillary Refill :


Height: 5'4.50"


Weight: 243lbs. oz. [phone]; 35.75 BMI


Method:Stated


General Appearance:  WD/WN, no apparent distress


Eyes:  Bilateral Eye Normal Inspection, Bilateral Eye PERRL, Bilateral Eye EOMI


HEENT:  PERRL/EOMI, TMs normal, pharynx normal


Neck:  supple


Respiratory:  no respiratory distress, no accessory muscle use, crackles 

(bilateral bases), other (room air Sats 94%)


Cardiovascular:  no murmur, tachycardia (140)


Gastrointestinal:  normal bowel sounds, non tender, soft


Extremities:  non-tender, normal inspection, no pedal edema, normal capillary 

refill


Neurologic/Psychiatric:  alert, normal mood/affect, oriented x 3


Skin:  normal color, warm/dry





Focused Exam


Lactate Level


22 22:58: Lactic Acid Level 1.03





Lactic Acid Level





Laboratory Tests








Test


 22


22:58


 


Lactic Acid Level


 1.03 MMOL/L


(0.50-2.00)











Progress/Results/Core Measures


Suspected Sepsis


SIRS


Temperature: 


Pulse:  


Respiratory Rate: 


 


Laboratory Tests


22 22:58: White Blood Count 6.5


Blood Pressure  / 


Mean: 


 





22 22:58: Lactic Acid Level 1.03


Laboratory Tests


22 22:58: 


Creatinine 1.38H, INR Comment 1.0, Platelet Count 207, Total Bilirubin 1.5H








Results/Orders


Lab Results





Laboratory Tests








Test


 22


22:20 22


22:46 22


22:58 Range/Units


 


 


Influenza Type A (RT-PCR) Not Detected    Not Detecte  


 


Influenza Type B (RT-PCR) Detected H   Not Detecte  


 


SARS-CoV-2 RNA (RT-PCR) Not Detected    Not Detecte  


 


Urine Color  YELLOW    


 


Urine Clarity  CLEAR    


 


Urine pH  7.0   5-9  


 


Urine Specific Gravity  1.015 L  1.016-1.022  


 


Urine Protein  NEGATIVE   NEGATIVE  


 


Urine Glucose (UA)  NEGATIVE   NEGATIVE  


 


Urine Ketones  NEGATIVE   NEGATIVE  


 


Urine Nitrite  NEGATIVE   NEGATIVE  


 


Urine Bilirubin  NEGATIVE   NEGATIVE  


 


Urine Urobilinogen  0.2   < = 1.0  MG/DL


 


Urine Leukocyte Esterase  1+ H  NEGATIVE  


 


Urine RBC (Auto)  3+ H  NEGATIVE  


 


Urine RBC  25-50 H   /HPF


 


Urine WBC  5-10 H   /HPF


 


Urine Squamous Epithelial


Cells 


 0-2 


 


  /HPF





 


Urine Crystals  NONE    /LPF


 


Urine Bacteria  FEW H   /HPF


 


Urine Casts  NONE    /LPF


 


Urine Mucus  NEGATIVE    /LPF


 


Urine Culture Indicated  YES    


 


White Blood Count


 


 


 6.5 


 4.3-11.0


10^3/uL


 


Red Blood Count


 


 


 4.57 


 3.80-5.11


10^6/uL


 


Hemoglobin   13.2  11.5-16.0  g/dL


 


Hematocrit   40  35-52  %


 


Mean Corpuscular Volume   88  80-99  fL


 


Mean Corpuscular Hemoglobin   29  25-34  pg


 


Mean Corpuscular Hemoglobin


Concent 


 


 33 


 32-36  g/dL





 


Red Cell Distribution Width   13.6  10.0-14.5  %


 


Platelet Count


 


 


 207 


 130-400


10^3/uL


 


Mean Platelet Volume   8.6 L 9.0-12.2  fL


 


Immature Granulocyte % (Auto)   0   %


 


Neutrophils (%) (Auto)   82 H 42-75  %


 


Lymphocytes (%) (Auto)   8 L 12-44  %


 


Monocytes (%) (Auto)   8  0-12  %


 


Eosinophils (%) (Auto)   2  0-10  %


 


Basophils (%) (Auto)   1  0-10  %


 


Neutrophils # (Auto)


 


 


 5.4 


 1.8-7.8


10^3/uL


 


Lymphocytes # (Auto)


 


 


 0.5 L


 1.0-4.0


10^3/uL


 


Monocytes # (Auto)


 


 


 0.5 


 0.0-1.0


10^3/uL


 


Eosinophils # (Auto)


 


 


 0.1 


 0.0-0.3


10^3/uL


 


Basophils # (Auto)


 


 


 0.0 


 0.0-0.1


10^3/uL


 


Immature Granulocyte # (Auto)


 


 


 0.0 


 0.0-0.1


10^3/uL


 


Prothrombin Time   13.5  12.2-14.7  SEC


 


INR Comment   1.0  0.8-1.4  


 


Activated Partial


Thromboplast Time 


 


 33 


 24-35  SEC





 


Sodium Level   140  135-145  MMOL/L


 


Potassium Level   4.1  3.6-5.0  MMOL/L


 


Chloride Level   105    MMOL/L


 


Carbon Dioxide Level   21  21-32  MMOL/L


 


Anion Gap   14  5-14  MMOL/L


 


Blood Urea Nitrogen   14  7-18  MG/DL


 


Creatinine


 


 


 1.38 H


 0.60-1.30


MG/DL


 


Estimat Glomerular Filtration


Rate 


 


 52 


  





 


BUN/Creatinine Ratio   10   


 


Glucose Level   100    MG/DL


 


Lactic Acid Level


 


 


 1.03 


 0.50-2.00


MMOL/L


 


Calcium Level   10.0  8.5-10.1  MG/DL


 


Corrected Calcium   9.8  8.5-10.1  MG/DL


 


Total Bilirubin   1.5 H 0.1-1.0  MG/DL


 


Aspartate Amino Transf


(AST/SGOT) 


 


 23 


 5-34  U/L





 


Alanine Aminotransferase


(ALT/SGPT) 


 


 39 


 0-55  U/L





 


Alkaline Phosphatase   59    U/L


 


B-Type Natriuretic Peptide   314.8 H <100.0  PG/ML


 


Total Protein   7.8  6.4-8.2  GM/DL


 


Albumin   4.2  3.2-4.5  GM/DL








My Orders





Orders - MICHAEL VALADEZ MD


Covid 19 Inhouse Test (22:)


Influenza A And B By Pcr (22:)


Isolation Central Supply Req (22:)


Cbc With Automated Diff (22:32)


Comprehensive Metabolic Panel (22:32)


Blood Culture (22:32)


Sputum Culture (22:)


Urinalysis (22:)


Urine Culture (22:32)


Protime With Inr (22:)


Partial Thromboplastin Time (22:32)


Chest 1 View, Ap/Pa Only (22:)


Ed Iv/Invasive Line Start (22:32)


Ed Iv/Invasive Line Start (22:32)


Vital Signs Adult Sepsis Patie Q15M (22:32)


O2 (22:)


Remove Rings In Anticipation O (22:)


Lactic Acid Analyzer (22:32)


Bnp [place] (22:32)


Ekg Tracing (22:32)


Acetaminophen  Tablet (Tylenol  Tablet) (22:)


Promethazine Injection (Phenergan Injec (22 23:45)


Ns (Ivpb) (Sodium Chloride 0.9% Ivpb Bag (22 23:39)





Medications Given in ED





Current Medications








 Medications  Dose


 Ordered  Sig/Remi


 Route  Start Time


 Stop Time Status Last Admin


Dose Admin


 


 Acetaminophen  1,000 mg  ONCE  ONCE


 PO  22 22:45


 22 22:46 DC 22 22:50


1,000 MG


 


 Promethazine HCl  25 mg  ONCE  ONCE


 IVP  22 23:45


 22 23:46 DC 22 23:43


25 MG


 


 Sodium Chloride  50 ml @ ud  STK-MED ONCE


 .ROUTE  22 23:39


 22 23:43 DC 22 23:43


999 MLS/HR








Vital Signs/I&O











 22





 22:15 22:15


 


Temp 39.5 


 


Pulse 135 


 


Resp 20 


 


B/P (MAP) 133/88 (103) 


 


Pulse Ox 92 


 


O2 Delivery Room Air Room Air





Capillary Refill :


Progress Note :  


   Time:  00:47


Progress Note


Heart rate down to about 111.  Oxygen saturations 94%.  Labs reviewed, not s

eptic.  Positive for influenza B.  She is now afebrile.  She looks good no 

increased work of breathing/respiratory distress.  Since her symptoms started 2 

days ago I am not going to give her a Tamiflu.  We will send her home with 

nausea medicines and cough medications to her pharmacy in [place].  I have

advised her to call her primary care doctor's office on Monday.  I am going to 

give her a little bit of Lasix for a couple of days as she has a little bit of 

excess fluid in her chest.  She is comfortable with this plan of care and 

agreeable.  All questions are sought and answered.  Patient did have evidence of

urinary tract infection on urinalysis however she is asymptomatic.  We discussed

antibiotics and at this time will hold off until her culture comes back, if she 

has growth on culture we will call her and put her on the antibiotics.  


Patient is stable for discharge.





ECG


Initial ECG Impression Date:  May 13, 2022


Initial ECG Impression Time:  22:40


Initial ECG Rate:  133


Initial ECG Rhythm:  S.Tach


Initial ECG Intervals


TN interval 158 





QTc 410


Comment


LVH, poor R wave progression over the precordium, no ST segment elevation or 

depression she has some nonspecific ST-T wave changes in the anterior leads.





Diagnostic Imaging





   Diagonstic Imaging:  Xray


   Plain Films/CT/US/NM/MRI:  chest


Comments


est reviewed by me - no infiltrate; positive fluids balance; no infiltrate; 

cardiomegaly





Departure


Impression





   Primary Impression:  


   Influenza B


Disposition:   HOME, SELF-CARE


Condition:  Improved





Departure-Patient Inst.


Decision time for Depature:  00:48


Referrals:  


MERI REYNA DO (PCP)


Primary Care Physician








OTTO MACIAS MD


Patient Instructions:  Flu, Adult ED





Add. Discharge Instructions:  


Current cardiac fluids to stay well-hydrated.





Take extra strength Tylenol which is 1000 mg every 6 hours for the next 1 to 2 

days.  This will keep your fever and your heart rate down.





I have sent home nausea medications, Zofran you can take 1 every 6-8 hours as 

needed for nausea.





I have also sent you home some cough medication.  Please take this every 6 hours

as needed.





Return to the emergency department for any worsening shortness of breath, high 

fevers that do not come down with Tylenol or any other emergent concerning 

symptoms.





Follow-up with your primary care doctor and Dr. Macias next week.


Scripts


Guaifenesin/Codeine (ROBITUSSIN AC (CODEINE) SYRUP) 10 Ml Syrp


10 ML PO Q8H PRN for cough, #120 ML


   Prov: MICHAEL VALADEZ MD         22 


Furosemide (Lasix) 20 Mg Tablet


20 MG PO DAILY for 3 Days, #3 TAB


   Prov: MICHAEL VALADEZ MD         22 


Ondansetron (Ondansetron Odt) 4 Mg Tab.rapdis


4 MG PO Q8H PRN for cough, #20 TAB


   Prov: MIHCAEL VALADEZ MD         22





Copy


Copies To 1:   OTTO MACIAS MD, KATHRYN M MD         May 13, 2022 22:40

## 2022-05-14 NOTE — DIAGNOSTIC IMAGING REPORT
Indication: Fever with dyspnea and cough.



Comparison: 02/03/2021.



Discussion: Single portable upright view of the chest was

obtained. Marked cardiomegaly is again noted. Left-sided AICD is

stable. No failure. No consolidation, pleural fluid, or

pneumothorax. No osseous abnormality.



Impression:

1. Stable cardiomegaly without failure.



Dictated by: 



  Dictated on workstation # [id number]